# Patient Record
Sex: FEMALE | Race: WHITE | NOT HISPANIC OR LATINO | Employment: OTHER | URBAN - METROPOLITAN AREA
[De-identification: names, ages, dates, MRNs, and addresses within clinical notes are randomized per-mention and may not be internally consistent; named-entity substitution may affect disease eponyms.]

---

## 2017-02-07 ENCOUNTER — HOSPITAL ENCOUNTER (OUTPATIENT)
Dept: INFUSION CENTER | Facility: CLINIC | Age: 77
Discharge: HOME | End: 2017-02-07
Payer: MEDICARE

## 2017-02-07 LAB — CANCER AG125 SERPL-ACNC: 13.7 U/ML (ref 0–30)

## 2017-02-07 PROCEDURE — 86304 IMMUNOASSAY TUMOR CA 125: CPT | Performed by: OBSTETRICS & GYNECOLOGY

## 2017-02-07 RX ADMIN — Medication 300 UNITS: at 08:35

## 2017-02-07 NOTE — PROGRESS NOTES
Pt without complaint, tolerated port flush with central labs as ordered by Dr Amelia St, well  Pt left unit in stable condition without question or concern  Pt declines AVS, pt will call to reschedule next port flush appt

## 2017-02-14 ENCOUNTER — ALLSCRIPTS OFFICE VISIT (OUTPATIENT)
Dept: OTHER | Facility: OTHER | Age: 77
End: 2017-02-14

## 2017-02-14 DIAGNOSIS — C54.9 MALIGNANT NEOPLASM OF CORPUS UTERI (HCC): ICD-10-CM

## 2017-04-04 ENCOUNTER — APPOINTMENT (OUTPATIENT)
Dept: LAB | Facility: CLINIC | Age: 77
End: 2017-04-04
Payer: MEDICARE

## 2017-04-04 ENCOUNTER — HOSPITAL ENCOUNTER (OUTPATIENT)
Dept: INFUSION CENTER | Facility: CLINIC | Age: 77
Discharge: HOME | End: 2017-04-04
Payer: MEDICARE

## 2017-04-04 ENCOUNTER — TRANSCRIBE ORDERS (OUTPATIENT)
Dept: LAB | Facility: CLINIC | Age: 77
End: 2017-04-04

## 2017-04-04 DIAGNOSIS — Z51.81 ENCOUNTER FOR THERAPEUTIC DRUG MONITORING: Primary | ICD-10-CM

## 2017-04-04 DIAGNOSIS — E13.9 OTHER SPECIFIED DIABETES MELLITUS: ICD-10-CM

## 2017-04-04 DIAGNOSIS — E13.9 OTHER SPECIFIED DIABETES MELLITUS: Primary | ICD-10-CM

## 2017-04-04 DIAGNOSIS — Z51.81 ENCOUNTER FOR THERAPEUTIC DRUG MONITORING: ICD-10-CM

## 2017-04-04 LAB
ALBUMIN SERPL BCP-MCNC: 3.8 G/DL (ref 3.5–5)
ALP SERPL-CCNC: 103 U/L (ref 46–116)
ALT SERPL W P-5'-P-CCNC: <6 U/L (ref 12–78)
ANION GAP SERPL CALCULATED.3IONS-SCNC: 9 MMOL/L (ref 4–13)
ANISOCYTOSIS BLD QL SMEAR: PRESENT
AST SERPL W P-5'-P-CCNC: 13 U/L (ref 5–45)
BASOPHILS # BLD AUTO: 0.06 THOUSAND/UL (ref 0–0.1)
BASOPHILS NFR MAR MANUAL: 1 % (ref 0–1)
BILIRUB SERPL-MCNC: 0.5 MG/DL (ref 0.2–1)
BUN SERPL-MCNC: 14 MG/DL (ref 5–25)
CALCIUM SERPL-MCNC: 8.8 MG/DL (ref 8.3–10.1)
CHLORIDE SERPL-SCNC: 105 MMOL/L (ref 100–108)
CO2 SERPL-SCNC: 28 MMOL/L (ref 21–32)
CREAT SERPL-MCNC: 0.59 MG/DL (ref 0.6–1.3)
CREAT UR-MCNC: 150 MG/DL
EOSINOPHIL # BLD AUTO: 0.31 THOUSAND/UL (ref 0–0.61)
EOSINOPHIL NFR BLD MANUAL: 5 % (ref 0–6)
ERYTHROCYTE [DISTWIDTH] IN BLOOD BY AUTOMATED COUNT: 16.1 % (ref 11.6–15.1)
ERYTHROCYTE [SEDIMENTATION RATE] IN BLOOD: 45 MM/HOUR (ref 0–20)
EST. AVERAGE GLUCOSE BLD GHB EST-MCNC: 171 MG/DL
GFR SERPL CREATININE-BSD FRML MDRD: >60 ML/MIN/1.73SQ M
GLUCOSE P FAST SERPL-MCNC: 161 MG/DL (ref 65–99)
HBA1C MFR BLD: 7.6 % (ref 4.2–6.3)
HCT VFR BLD AUTO: 32.2 % (ref 34.8–46.1)
HGB BLD-MCNC: 10.6 G/DL (ref 11.5–15.4)
LG PLATELETS BLD QL SMEAR: PRESENT
LYMPHOCYTES # BLD AUTO: 1.4 THOUSAND/UL (ref 0.6–4.47)
LYMPHOCYTES # BLD AUTO: 23 % (ref 14–44)
MCH RBC QN AUTO: 31.3 PG (ref 26.8–34.3)
MCHC RBC AUTO-ENTMCNC: 33 G/DL (ref 31.4–37.4)
MCV RBC AUTO: 95 FL (ref 82–98)
MICROALBUMIN UR-MCNC: 33.4 MG/L (ref 0–20)
MICROALBUMIN/CREAT 24H UR: 22 MG/G CREATININE (ref 0–30)
MONOCYTES # BLD AUTO: 0.43 THOUSAND/UL (ref 0–1.22)
MONOCYTES NFR BLD AUTO: 7 % (ref 4–12)
NEUTS BAND NFR BLD MANUAL: 3 % (ref 0–8)
NEUTS SEG # BLD: 3.9 THOUSAND/UL (ref 1.81–6.82)
NEUTS SEG NFR BLD AUTO: 61 % (ref 43–75)
PLATELET # BLD AUTO: 186 THOUSANDS/UL (ref 149–390)
PLATELET BLD QL SMEAR: ADEQUATE
PMV BLD AUTO: 9.1 FL (ref 8.9–12.7)
POTASSIUM SERPL-SCNC: 4 MMOL/L (ref 3.5–5.3)
PROT SERPL-MCNC: 6.9 G/DL (ref 6.4–8.2)
RBC # BLD AUTO: 3.39 MILLION/UL (ref 3.81–5.12)
SODIUM SERPL-SCNC: 142 MMOL/L (ref 136–145)
TOTAL CELLS COUNTED SPEC: 100
WBC # BLD AUTO: 6.1 THOUSAND/UL (ref 4.31–10.16)
WBC NRBC COR # BLD: 6.1 THOUSAND/UL (ref 4.31–10.16)

## 2017-04-04 PROCEDURE — 85027 COMPLETE CBC AUTOMATED: CPT

## 2017-04-04 PROCEDURE — 80053 COMPREHEN METABOLIC PANEL: CPT

## 2017-04-04 PROCEDURE — 85007 BL SMEAR W/DIFF WBC COUNT: CPT

## 2017-04-04 PROCEDURE — 85652 RBC SED RATE AUTOMATED: CPT

## 2017-04-04 PROCEDURE — 82043 UR ALBUMIN QUANTITATIVE: CPT | Performed by: FAMILY MEDICINE

## 2017-04-04 PROCEDURE — 82570 ASSAY OF URINE CREATININE: CPT | Performed by: FAMILY MEDICINE

## 2017-04-04 PROCEDURE — 83036 HEMOGLOBIN GLYCOSYLATED A1C: CPT

## 2017-04-04 RX ADMIN — Medication 300 UNITS: at 09:31

## 2017-04-04 NOTE — PLAN OF CARE
Problem: Potential for Falls  Goal: Patient will remain free of falls  INTERVENTIONS:  - Assess patient frequently for physical needs  - Identify cognitive and physical deficits and behaviors that affect risk of falls    - [place] fall precautions as indicated by assessment   - Educate patient/family on patient safety including physical limitations  - Instruct patient to call for assistance with activity based on assessment  - Modify environment to reduce risk of injury  - Consider OT/PT consult to assist with strengthening/mobility   Outcome: Progressing

## 2017-04-04 NOTE — PROGRESS NOTES
Pt here today for central labs, denies any discomforts, goo blood return noted    Pt aware of next appointment, declines AVS

## 2017-05-24 ENCOUNTER — HOSPITAL ENCOUNTER (OUTPATIENT)
Dept: INFUSION CENTER | Facility: CLINIC | Age: 77
Discharge: HOME | End: 2017-05-24
Payer: MEDICARE

## 2017-05-24 ENCOUNTER — TRANSCRIBE ORDERS (OUTPATIENT)
Dept: LAB | Facility: CLINIC | Age: 77
End: 2017-05-24

## 2017-05-24 ENCOUNTER — APPOINTMENT (OUTPATIENT)
Dept: LAB | Facility: CLINIC | Age: 77
End: 2017-05-24
Payer: MEDICARE

## 2017-05-24 DIAGNOSIS — Z51.81 ENCOUNTER FOR THERAPEUTIC DRUG MONITORING: Primary | ICD-10-CM

## 2017-05-24 DIAGNOSIS — Z51.81 ENCOUNTER FOR THERAPEUTIC DRUG MONITORING: ICD-10-CM

## 2017-05-24 LAB
ALBUMIN SERPL BCP-MCNC: 3.9 G/DL (ref 3.5–5)
ALT SERPL W P-5'-P-CCNC: 6 U/L (ref 12–78)
AST SERPL W P-5'-P-CCNC: 39 U/L (ref 5–45)
CREAT SERPL-MCNC: 0.66 MG/DL (ref 0.6–1.3)
ERYTHROCYTE [DISTWIDTH] IN BLOOD BY AUTOMATED COUNT: 16 % (ref 11.6–15.1)
ERYTHROCYTE [SEDIMENTATION RATE] IN BLOOD: 40 MM/HOUR (ref 0–20)
GFR SERPL CREATININE-BSD FRML MDRD: >60 ML/MIN/1.73SQ M
GRANULOCYTES NFR BLD AUTO: 78.9 % (ref 47–80)
GRANULOCYTES NFR BLD: 5 THOUSAND/ΜL (ref 1.85–7.82)
HCT VFR BLD AUTO: 33.7 % (ref 34.8–46.1)
HGB BLD-MCNC: 11 G/DL (ref 11.5–15.4)
LYMPHOCYTES # BLD AUTO: 1.1 THOUSANDS/ΜL (ref 0.6–4.47)
LYMPHOCYTES NFR BLD AUTO: 17 % (ref 14–44)
MCH RBC QN AUTO: 31 PG (ref 26.8–34.3)
MCHC RBC AUTO-ENTMCNC: 32.6 G/DL (ref 31.4–37.4)
MCV RBC AUTO: 95 FL (ref 82–98)
MONOCYTES # BLD AUTO: 0.3 THOUSAND/ΜL (ref 0.17–1.22)
MONOCYTES NFR BLD AUTO: 4 % (ref 4–12)
PLATELET # BLD AUTO: 190 THOUSANDS/UL (ref 149–390)
PMV BLD AUTO: 8.3 FL (ref 8.9–12.7)
RBC # BLD AUTO: 3.55 MILLION/UL (ref 3.81–5.12)
WBC # BLD AUTO: 6.4 THOUSAND/UL (ref 4.31–10.16)
WBC NRBC COR # BLD: 6.4 THOUSAND/UL (ref 4.31–10.16)

## 2017-05-24 PROCEDURE — 82040 ASSAY OF SERUM ALBUMIN: CPT

## 2017-05-24 PROCEDURE — 84460 ALANINE AMINO (ALT) (SGPT): CPT

## 2017-05-24 PROCEDURE — 85025 COMPLETE CBC W/AUTO DIFF WBC: CPT

## 2017-05-24 PROCEDURE — 85652 RBC SED RATE AUTOMATED: CPT

## 2017-05-24 PROCEDURE — 82565 ASSAY OF CREATININE: CPT

## 2017-05-24 PROCEDURE — 84450 TRANSFERASE (AST) (SGOT): CPT

## 2017-05-24 RX ADMIN — Medication 300 UNITS: at 10:27

## 2017-06-22 ENCOUNTER — TRANSCRIBE ORDERS (OUTPATIENT)
Dept: ADMINISTRATIVE | Facility: HOSPITAL | Age: 77
End: 2017-06-22

## 2017-06-22 DIAGNOSIS — C55 MALIGNANT NEOPLASM OF UTERUS (HCC): ICD-10-CM

## 2017-06-22 DIAGNOSIS — C55 MALIGNANT NEOPLASM OF UTERUS, PART UNSPECIFIED (HCC): Primary | ICD-10-CM

## 2017-06-22 DIAGNOSIS — N93.9 VAGINAL HEMORRHAGE: ICD-10-CM

## 2017-06-22 DIAGNOSIS — N93.9 ABNORMAL UTERINE AND VAGINAL BLEEDING, UNSPECIFIED: ICD-10-CM

## 2017-06-22 DIAGNOSIS — K62.5 HEMORRHAGE OF ANUS AND RECTUM: ICD-10-CM

## 2017-06-22 DIAGNOSIS — R19.7 DIARRHEA: ICD-10-CM

## 2017-06-28 ENCOUNTER — HOSPITAL ENCOUNTER (OUTPATIENT)
Dept: INFUSION CENTER | Facility: CLINIC | Age: 77
Discharge: HOME | End: 2017-06-28
Payer: MEDICARE

## 2017-06-28 LAB
BUN SERPL-MCNC: 13 MG/DL (ref 5–25)
CANCER AG125 SERPL-ACNC: 14.7 U/ML (ref 0–30)
CREAT SERPL-MCNC: 0.71 MG/DL (ref 0.6–1.3)
GFR SERPL CREATININE-BSD FRML MDRD: >60 ML/MIN/1.73SQ M

## 2017-06-28 PROCEDURE — 84520 ASSAY OF UREA NITROGEN: CPT | Performed by: OBSTETRICS & GYNECOLOGY

## 2017-06-28 PROCEDURE — 82565 ASSAY OF CREATININE: CPT | Performed by: OBSTETRICS & GYNECOLOGY

## 2017-06-28 PROCEDURE — 86304 IMMUNOASSAY TUMOR CA 125: CPT | Performed by: OBSTETRICS & GYNECOLOGY

## 2017-06-28 RX ADMIN — Medication 300 UNITS: at 09:53

## 2017-06-30 ENCOUNTER — HOSPITAL ENCOUNTER (OUTPATIENT)
Dept: CT IMAGING | Facility: HOSPITAL | Age: 77
Discharge: HOME | End: 2017-06-30
Payer: MEDICARE

## 2017-06-30 DIAGNOSIS — N93.9 ABNORMAL UTERINE AND VAGINAL BLEEDING, UNSPECIFIED: ICD-10-CM

## 2017-06-30 DIAGNOSIS — C55 MALIGNANT NEOPLASM OF UTERUS (HCC): ICD-10-CM

## 2017-06-30 PROCEDURE — 74177 CT ABD & PELVIS W/CONTRAST: CPT

## 2017-06-30 RX ADMIN — IOHEXOL 100 ML: 350 INJECTION, SOLUTION INTRAVENOUS at 13:30

## 2017-07-11 ENCOUNTER — ALLSCRIPTS OFFICE VISIT (OUTPATIENT)
Dept: OTHER | Facility: OTHER | Age: 77
End: 2017-07-11

## 2017-08-22 ENCOUNTER — APPOINTMENT (OUTPATIENT)
Dept: LAB | Facility: CLINIC | Age: 77
End: 2017-08-22
Payer: MEDICARE

## 2017-08-22 ENCOUNTER — HOSPITAL ENCOUNTER (OUTPATIENT)
Dept: INFUSION CENTER | Facility: CLINIC | Age: 77
Discharge: HOME | End: 2017-08-22
Payer: MEDICARE

## 2017-08-22 ENCOUNTER — TRANSCRIBE ORDERS (OUTPATIENT)
Dept: LAB | Facility: CLINIC | Age: 77
End: 2017-08-22

## 2017-08-22 DIAGNOSIS — Z51.81 ENCOUNTER FOR THERAPEUTIC DRUG MONITORING: Primary | ICD-10-CM

## 2017-08-22 DIAGNOSIS — Z51.81 ENCOUNTER FOR THERAPEUTIC DRUG MONITORING: ICD-10-CM

## 2017-08-22 LAB
ALBUMIN SERPL BCP-MCNC: 2.5 G/DL (ref 3.5–5)
ALT SERPL W P-5'-P-CCNC: 22 U/L (ref 12–78)
AST SERPL W P-5'-P-CCNC: 20 U/L (ref 5–45)
BASOPHILS # BLD AUTO: 0 THOUSANDS/ΜL (ref 0–0.1)
BASOPHILS NFR BLD AUTO: 0 % (ref 0–1)
CREAT SERPL-MCNC: 0.57 MG/DL (ref 0.6–1.3)
EOSINOPHIL # BLD AUTO: 0.11 THOUSAND/ΜL (ref 0–0.61)
EOSINOPHIL NFR BLD AUTO: 4 % (ref 0–6)
ERYTHROCYTE [DISTWIDTH] IN BLOOD BY AUTOMATED COUNT: 15.5 % (ref 11.6–15.1)
ERYTHROCYTE [SEDIMENTATION RATE] IN BLOOD: 48 MM/HOUR (ref 0–20)
GFR SERPL CREATININE-BSD FRML MDRD: 90 ML/MIN/1.73SQ M
HCT VFR BLD AUTO: 20.7 % (ref 34.8–46.1)
HGB BLD-MCNC: 6.7 G/DL (ref 11.5–15.4)
LYMPHOCYTES # BLD AUTO: 0.61 THOUSANDS/ΜL (ref 0.6–4.47)
LYMPHOCYTES NFR BLD AUTO: 19 % (ref 14–44)
MCH RBC QN AUTO: 31.5 PG (ref 26.8–34.3)
MCHC RBC AUTO-ENTMCNC: 32.4 G/DL (ref 31.4–37.4)
MCV RBC AUTO: 97 FL (ref 82–98)
MONOCYTES # BLD AUTO: 0.34 THOUSAND/ΜL (ref 0.17–1.22)
MONOCYTES NFR BLD AUTO: 11 % (ref 4–12)
NEUTROPHILS # BLD AUTO: 2.11 THOUSANDS/ΜL (ref 1.85–7.62)
NEUTS SEG NFR BLD AUTO: 66 % (ref 43–75)
NRBC BLD AUTO-RTO: 0 /100 WBCS
PLATELET # BLD AUTO: 113 THOUSANDS/UL (ref 149–390)
PMV BLD AUTO: 11 FL (ref 8.9–12.7)
RBC # BLD AUTO: 2.13 MILLION/UL (ref 3.81–5.12)
WBC # BLD AUTO: 3.17 THOUSAND/UL (ref 4.31–10.16)

## 2017-08-22 PROCEDURE — 82565 ASSAY OF CREATININE: CPT

## 2017-08-22 PROCEDURE — 84460 ALANINE AMINO (ALT) (SGPT): CPT

## 2017-08-22 PROCEDURE — 82040 ASSAY OF SERUM ALBUMIN: CPT

## 2017-08-22 PROCEDURE — 36415 COLL VENOUS BLD VENIPUNCTURE: CPT

## 2017-08-22 PROCEDURE — 85652 RBC SED RATE AUTOMATED: CPT

## 2017-08-22 PROCEDURE — 85025 COMPLETE CBC W/AUTO DIFF WBC: CPT

## 2017-08-22 PROCEDURE — 84450 TRANSFERASE (AST) (SGOT): CPT

## 2017-08-22 RX ADMIN — Medication 300 UNITS: at 10:13

## 2017-08-22 NOTE — PROGRESS NOTES
Pt without complaint, labs drawn from port and port flushed per hospital protocol  Pt tolerated all well, left unit without question or concern  Pt will call to schedule next port flush appt

## 2017-10-03 ENCOUNTER — HOSPITAL ENCOUNTER (OUTPATIENT)
Dept: INFUSION CENTER | Facility: CLINIC | Age: 77
Discharge: HOME | End: 2017-10-03
Payer: MEDICARE

## 2017-10-03 ENCOUNTER — APPOINTMENT (OUTPATIENT)
Dept: LAB | Facility: CLINIC | Age: 77
End: 2017-10-03
Payer: MEDICARE

## 2017-10-03 ENCOUNTER — TRANSCRIBE ORDERS (OUTPATIENT)
Dept: LAB | Facility: CLINIC | Age: 77
End: 2017-10-03

## 2017-10-03 VITALS
SYSTOLIC BLOOD PRESSURE: 142 MMHG | DIASTOLIC BLOOD PRESSURE: 78 MMHG | RESPIRATION RATE: 18 BRPM | TEMPERATURE: 98.1 F | HEART RATE: 78 BPM

## 2017-10-03 DIAGNOSIS — E13.8 DIABETES MELLITUS OF OTHER TYPE WITH COMPLICATION, UNSPECIFIED LONG TERM INSULIN USE STATUS: ICD-10-CM

## 2017-10-03 DIAGNOSIS — E13.8 DIABETES MELLITUS OF OTHER TYPE WITH COMPLICATION, UNSPECIFIED LONG TERM INSULIN USE STATUS: Primary | ICD-10-CM

## 2017-10-03 LAB
CHOLEST SERPL-MCNC: 159 MG/DL (ref 50–200)
HDLC SERPL-MCNC: 71 MG/DL (ref 40–60)
LDLC SERPL CALC-MCNC: 67 MG/DL (ref 0–100)
TRIGL SERPL-MCNC: 106 MG/DL

## 2017-10-03 PROCEDURE — 83036 HEMOGLOBIN GLYCOSYLATED A1C: CPT

## 2017-10-03 PROCEDURE — 80061 LIPID PANEL: CPT

## 2017-10-03 RX ADMIN — Medication 300 UNITS: at 09:35

## 2017-10-03 NOTE — PROGRESS NOTES
Patient here for just lab work today  Port accessed, good blood return  Labs drawn and flush performed   Patient provided with AVS

## 2017-10-03 NOTE — PLAN OF CARE
Problem: Potential for Falls  Goal: Patient will remain free of falls  INTERVENTIONS:  - Assess patient frequently for physical needs  -  Identify cognitive and physical deficits and behaviors that affect risk of falls    -  [place] fall precautions as indicated by assessment   - Educate patient/family on patient safety including physical limitations  - Instruct patient to call for assistance with activity based on assessment  - Modify environment to reduce risk of injury  - Consider OT/PT consult to assist with strengthening/mobility   Outcome: Progressing

## 2017-10-04 LAB
EST. AVERAGE GLUCOSE BLD GHB EST-MCNC: 174 MG/DL
HBA1C MFR BLD: 7.7 % (ref 4.2–6.3)

## 2017-11-29 ENCOUNTER — HOSPITAL ENCOUNTER (OUTPATIENT)
Dept: INFUSION CENTER | Facility: CLINIC | Age: 77
Discharge: HOME | End: 2017-11-29
Payer: MEDICARE

## 2017-11-29 ENCOUNTER — APPOINTMENT (OUTPATIENT)
Dept: LAB | Facility: CLINIC | Age: 77
End: 2017-11-29
Payer: MEDICARE

## 2017-11-29 ENCOUNTER — TRANSCRIBE ORDERS (OUTPATIENT)
Dept: LAB | Facility: CLINIC | Age: 77
End: 2017-11-29

## 2017-11-29 DIAGNOSIS — M05.771 RHEUMATOID ARTHRITIS INVOLVING BOTH ANKLES WITH POSITIVE RHEUMATOID FACTOR (HCC): Primary | ICD-10-CM

## 2017-11-29 DIAGNOSIS — M05.772 RHEUMATOID ARTHRITIS INVOLVING BOTH ANKLES WITH POSITIVE RHEUMATOID FACTOR (HCC): Primary | ICD-10-CM

## 2017-11-29 DIAGNOSIS — M05.772 RHEUMATOID ARTHRITIS INVOLVING BOTH ANKLES WITH POSITIVE RHEUMATOID FACTOR (HCC): ICD-10-CM

## 2017-11-29 DIAGNOSIS — M05.771 RHEUMATOID ARTHRITIS INVOLVING BOTH ANKLES WITH POSITIVE RHEUMATOID FACTOR (HCC): ICD-10-CM

## 2017-11-29 LAB
ALBUMIN SERPL BCP-MCNC: 3.5 G/DL (ref 3.5–5)
ALT SERPL W P-5'-P-CCNC: 52 U/L (ref 12–78)
AST SERPL W P-5'-P-CCNC: 32 U/L (ref 5–45)
CREAT SERPL-MCNC: 0.63 MG/DL (ref 0.6–1.3)
ERYTHROCYTE [DISTWIDTH] IN BLOOD BY AUTOMATED COUNT: 17 % (ref 11.6–15.1)
ERYTHROCYTE [SEDIMENTATION RATE] IN BLOOD: 30 MM/HOUR (ref 0–20)
GFR SERPL CREATININE-BSD FRML MDRD: 87 ML/MIN/1.73SQ M
GRANULOCYTES NFR BLD AUTO: 73.4 % (ref 47–80)
GRANULOCYTES NFR BLD: 4.6 THOUSAND/ΜL (ref 1.85–7.82)
HCT VFR BLD AUTO: 32.4 % (ref 34.8–46.1)
HGB BLD-MCNC: 10.7 G/DL (ref 11.5–15.4)
LYMPHOCYTES # BLD AUTO: 1.3 THOUSANDS/ΜL (ref 0.6–4.47)
LYMPHOCYTES NFR BLD AUTO: 21 % (ref 14–44)
MCH RBC QN AUTO: 31.3 PG (ref 26.8–34.3)
MCHC RBC AUTO-ENTMCNC: 33 G/DL (ref 31.4–37.4)
MCV RBC AUTO: 95 FL (ref 82–98)
MONOCYTES # BLD AUTO: 0.4 THOUSAND/ΜL (ref 0.17–1.22)
MONOCYTES NFR BLD AUTO: 6 % (ref 4–12)
PLATELET # BLD AUTO: 148 THOUSANDS/UL (ref 149–390)
PMV BLD AUTO: 8.5 FL (ref 8.9–12.7)
RBC # BLD AUTO: 3.41 MILLION/UL (ref 3.81–5.12)
WBC # BLD AUTO: 6.2 THOUSAND/UL (ref 4.31–10.16)
WBC NRBC COR # BLD: 6.2 THOUSAND/UL (ref 4.31–10.16)

## 2017-11-29 PROCEDURE — 36415 COLL VENOUS BLD VENIPUNCTURE: CPT

## 2017-11-29 PROCEDURE — 82040 ASSAY OF SERUM ALBUMIN: CPT

## 2017-11-29 PROCEDURE — 85652 RBC SED RATE AUTOMATED: CPT

## 2017-11-29 PROCEDURE — 84450 TRANSFERASE (AST) (SGOT): CPT

## 2017-11-29 PROCEDURE — 84460 ALANINE AMINO (ALT) (SGPT): CPT

## 2017-11-29 PROCEDURE — 85025 COMPLETE CBC W/AUTO DIFF WBC: CPT

## 2017-11-29 PROCEDURE — 82565 ASSAY OF CREATININE: CPT

## 2017-11-29 RX ADMIN — Medication 300 UNITS: at 09:35

## 2017-11-29 NOTE — PROGRESS NOTES
Pt without complaint, central labs drawn as per Dr Rosemarie Sevilla order, port flushed per protocol, pt tolerated all well, AVS provided

## 2018-01-02 ENCOUNTER — HOSPITAL ENCOUNTER (OUTPATIENT)
Dept: INFUSION CENTER | Facility: CLINIC | Age: 78
Discharge: HOME | End: 2018-01-04
Payer: MEDICARE

## 2018-01-02 LAB — CANCER AG125 SERPL-ACNC: 10.6 U/ML (ref 0–30)

## 2018-01-02 PROCEDURE — 86304 IMMUNOASSAY TUMOR CA 125: CPT | Performed by: OBSTETRICS & GYNECOLOGY

## 2018-01-02 RX ADMIN — Medication 300 UNITS: at 09:23

## 2018-01-02 NOTE — PROGRESS NOTES
Central labs drawn via port  Catheter maintenance performed per protocol without complications  Pt will call to schedule her next appointment   Declined AVS

## 2018-01-14 VITALS
RESPIRATION RATE: 16 BRPM | DIASTOLIC BLOOD PRESSURE: 72 MMHG | SYSTOLIC BLOOD PRESSURE: 150 MMHG | HEART RATE: 78 BPM | HEIGHT: 62 IN | WEIGHT: 175.38 LBS | TEMPERATURE: 97.8 F | BODY MASS INDEX: 32.27 KG/M2

## 2018-01-14 VITALS
RESPIRATION RATE: 20 BRPM | WEIGHT: 177.25 LBS | TEMPERATURE: 97.7 F | DIASTOLIC BLOOD PRESSURE: 74 MMHG | BODY MASS INDEX: 32.62 KG/M2 | HEIGHT: 62 IN | HEART RATE: 78 BPM | SYSTOLIC BLOOD PRESSURE: 146 MMHG

## 2018-01-16 ENCOUNTER — GENERIC CONVERSION - ENCOUNTER (OUTPATIENT)
Dept: OTHER | Facility: OTHER | Age: 78
End: 2018-01-16

## 2018-01-16 DIAGNOSIS — C55 MALIGNANT NEOPLASM OF UTERUS (HCC): ICD-10-CM

## 2018-01-16 DIAGNOSIS — C54.9 MALIGNANT NEOPLASM OF CORPUS UTERI (HCC): ICD-10-CM

## 2018-01-24 VITALS
HEART RATE: 88 BPM | TEMPERATURE: 97.9 F | WEIGHT: 175.5 LBS | DIASTOLIC BLOOD PRESSURE: 72 MMHG | RESPIRATION RATE: 20 BRPM | SYSTOLIC BLOOD PRESSURE: 140 MMHG | BODY MASS INDEX: 32.3 KG/M2 | HEIGHT: 62 IN

## 2018-03-06 ENCOUNTER — APPOINTMENT (OUTPATIENT)
Dept: LAB | Facility: CLINIC | Age: 78
End: 2018-03-06
Payer: MEDICARE

## 2018-03-06 ENCOUNTER — TRANSCRIBE ORDERS (OUTPATIENT)
Dept: LAB | Facility: CLINIC | Age: 78
End: 2018-03-06

## 2018-03-06 ENCOUNTER — HOSPITAL ENCOUNTER (OUTPATIENT)
Dept: INFUSION CENTER | Facility: CLINIC | Age: 78
Discharge: HOME | End: 2018-03-06
Payer: MEDICARE

## 2018-03-06 DIAGNOSIS — Z51.81 ENCOUNTER FOR THERAPEUTIC DRUG MONITORING: ICD-10-CM

## 2018-03-06 DIAGNOSIS — E13.10 TYPE II OR UNSPECIFIED TYPE DIABETES MELLITUS WITH KETOACIDOSIS, UNCONTROLLED(250.12) (HCC): Primary | ICD-10-CM

## 2018-03-06 DIAGNOSIS — Z51.81 ENCOUNTER FOR THERAPEUTIC DRUG MONITORING: Primary | ICD-10-CM

## 2018-03-06 DIAGNOSIS — E13.10 TYPE II OR UNSPECIFIED TYPE DIABETES MELLITUS WITH KETOACIDOSIS, UNCONTROLLED(250.12) (HCC): ICD-10-CM

## 2018-03-06 LAB
ALBUMIN SERPL BCP-MCNC: 3.6 G/DL (ref 3.2–5)
ALBUMIN SERPL BCP-MCNC: 3.6 G/DL (ref 3.5–5)
ALP SERPL-CCNC: 102 U/L (ref 46–116)
ALT SERPL W P-5'-P-CCNC: 82 U/L (ref 12–78)
ALT SERPL W P-5'-P-CCNC: 99 U/L (ref 12–78)
ANION GAP SERPL CALCULATED.3IONS-SCNC: 8 MMOL/L (ref 4–13)
AST SERPL W P-5'-P-CCNC: 70 U/L (ref 5–45)
AST SERPL W P-5'-P-CCNC: 70 U/L (ref 5–45)
BASOPHILS # BLD AUTO: 0 THOUSAND/UL (ref 0–0.1)
BASOPHILS NFR MAR MANUAL: 0 % (ref 0–1)
BILIRUB SERPL-MCNC: 0.7 MG/DL (ref 0.2–1)
BUN SERPL-MCNC: 15 MG/DL (ref 5–25)
CALCIUM SERPL-MCNC: 9.3 MG/DL (ref 8.3–10.1)
CHLORIDE SERPL-SCNC: 101 MMOL/L (ref 98–108)
CHOLEST SERPL-MCNC: 161 MG/DL (ref 50–200)
CO2 SERPL-SCNC: 28 MMOL/L (ref 21–32)
CREAT SERPL-MCNC: 0.57 MG/DL (ref 0.6–1.3)
CREAT SERPL-MCNC: 0.8 MG/DL (ref 0.6–1.3)
CREAT UR-MCNC: 145 MG/DL
EOSINOPHIL # BLD AUTO: 0.15 THOUSAND/UL (ref 0–0.61)
EOSINOPHIL NFR BLD MANUAL: 2 % (ref 0–6)
ERYTHROCYTE [DISTWIDTH] IN BLOOD BY AUTOMATED COUNT: 16.8 % (ref 11.6–15.1)
ERYTHROCYTE [SEDIMENTATION RATE] IN BLOOD: 34 MM/HOUR (ref 0–20)
EST. AVERAGE GLUCOSE BLD GHB EST-MCNC: 220 MG/DL
GFR SERPL CREATININE-BSD FRML MDRD: 71 ML/MIN/1.73SQ M
GFR SERPL CREATININE-BSD FRML MDRD: 90 ML/MIN/1.73SQ M
GLUCOSE SERPL-MCNC: 215 MG/DL (ref 65–140)
HBA1C MFR BLD: 9.3 % (ref 4.2–6.3)
HCT VFR BLD AUTO: 35.1 % (ref 34.8–46.1)
HDLC SERPL-MCNC: 78 MG/DL (ref 40–60)
HGB BLD-MCNC: 11.5 G/DL (ref 11.5–15.4)
LDLC SERPL CALC-MCNC: 66 MG/DL (ref 0–100)
LYMPHOCYTES # BLD AUTO: 1.16 THOUSAND/UL (ref 0.6–4.47)
LYMPHOCYTES # BLD AUTO: 15 % (ref 14–44)
MCH RBC QN AUTO: 32.2 PG (ref 26.8–34.3)
MCHC RBC AUTO-ENTMCNC: 32.8 G/DL (ref 31.4–37.4)
MCV RBC AUTO: 98 FL (ref 82–98)
MICROALBUMIN UR-MCNC: 45.1 MG/L (ref 0–20)
MICROALBUMIN/CREAT 24H UR: 31 MG/G CREATININE (ref 0–30)
MONOCYTES # BLD AUTO: 0.46 THOUSAND/UL (ref 0–1.22)
MONOCYTES NFR BLD AUTO: 6 % (ref 4–12)
NEUTS BAND NFR BLD MANUAL: 1 % (ref 0–8)
NEUTS SEG # BLD: 5.93 THOUSAND/UL (ref 1.81–6.82)
NEUTS SEG NFR BLD AUTO: 76 % (ref 43–75)
PLATELET # BLD AUTO: 155 THOUSANDS/UL (ref 149–390)
PLATELET BLD QL SMEAR: ADEQUATE
PMV BLD AUTO: 9.4 FL (ref 8.9–12.7)
POTASSIUM SERPL-SCNC: 4 MMOL/L (ref 3.5–5.3)
PROT SERPL-MCNC: 7 G/DL (ref 6.4–8.2)
RBC # BLD AUTO: 3.58 MILLION/UL (ref 3.81–5.12)
RBC MORPH BLD: NORMAL
SODIUM SERPL-SCNC: 137 MMOL/L (ref 136–145)
TOTAL CELLS COUNTED SPEC: 100
TRIGL SERPL-MCNC: 84 MG/DL
WBC # BLD AUTO: 7.7 THOUSAND/UL (ref 4.31–10.16)
WBC NRBC COR # BLD: 7.7 THOUSAND/UL (ref 4.31–10.16)

## 2018-03-06 PROCEDURE — 82565 ASSAY OF CREATININE: CPT

## 2018-03-06 PROCEDURE — 80053 COMPREHEN METABOLIC PANEL: CPT

## 2018-03-06 PROCEDURE — 82043 UR ALBUMIN QUANTITATIVE: CPT | Performed by: FAMILY MEDICINE

## 2018-03-06 PROCEDURE — 85652 RBC SED RATE AUTOMATED: CPT

## 2018-03-06 PROCEDURE — 83036 HEMOGLOBIN GLYCOSYLATED A1C: CPT

## 2018-03-06 PROCEDURE — 85027 COMPLETE CBC AUTOMATED: CPT

## 2018-03-06 PROCEDURE — 84450 TRANSFERASE (AST) (SGOT): CPT

## 2018-03-06 PROCEDURE — 82040 ASSAY OF SERUM ALBUMIN: CPT

## 2018-03-06 PROCEDURE — 84460 ALANINE AMINO (ALT) (SGPT): CPT

## 2018-03-06 PROCEDURE — 85007 BL SMEAR W/DIFF WBC COUNT: CPT

## 2018-03-06 PROCEDURE — 82570 ASSAY OF URINE CREATININE: CPT | Performed by: FAMILY MEDICINE

## 2018-03-06 PROCEDURE — 36415 COLL VENOUS BLD VENIPUNCTURE: CPT

## 2018-03-06 PROCEDURE — 80061 LIPID PANEL: CPT

## 2018-03-06 RX ADMIN — Medication 300 UNITS: at 09:40

## 2018-03-06 NOTE — PLAN OF CARE
Problem: Potential for Falls  Goal: Patient will remain free of falls  INTERVENTIONS:  - Assess patient frequently for physical needs  -  Identify cognitive and physical deficits and behaviors that affect risk of falls    -  [place] fall precautions as indicated by assessment   - Educate patient/family on patient safety including physical limitations  - Instruct patient to call for assistance with activity based on assessment  - Modify environment to reduce risk of injury  - Consider OT/PT consult to assist with strengthening/mobility   Outcome: Progressing

## 2018-03-06 NOTE — PROGRESS NOTES
Patient here for central labs  Port accessed without difficulty, good blood return noted, labs drawn and sent  Port flushed with Heparin per protocol  Patient declines AVS today, left unit in stable condition

## 2018-06-05 ENCOUNTER — APPOINTMENT (OUTPATIENT)
Dept: LAB | Facility: CLINIC | Age: 78
End: 2018-06-05
Payer: MEDICARE

## 2018-06-05 ENCOUNTER — TRANSCRIBE ORDERS (OUTPATIENT)
Dept: LAB | Facility: CLINIC | Age: 78
End: 2018-06-05

## 2018-06-05 ENCOUNTER — HOSPITAL ENCOUNTER (OUTPATIENT)
Dept: INFUSION CENTER | Facility: CLINIC | Age: 78
Discharge: HOME | End: 2018-06-05
Payer: MEDICARE

## 2018-06-05 DIAGNOSIS — M05.742 RHEUMATOID ARTHRITIS INVOLVING BOTH HANDS WITH POSITIVE RHEUMATOID FACTOR (HCC): ICD-10-CM

## 2018-06-05 DIAGNOSIS — Z79.4 ENCOUNTER FOR LONG-TERM (CURRENT) USE OF INSULIN (HCC): ICD-10-CM

## 2018-06-05 DIAGNOSIS — M05.742 RHEUMATOID ARTHRITIS INVOLVING BOTH HANDS WITH POSITIVE RHEUMATOID FACTOR (HCC): Primary | ICD-10-CM

## 2018-06-05 DIAGNOSIS — M05.741 RHEUMATOID ARTHRITIS INVOLVING BOTH HANDS WITH POSITIVE RHEUMATOID FACTOR (HCC): Primary | ICD-10-CM

## 2018-06-05 DIAGNOSIS — M05.741 RHEUMATOID ARTHRITIS INVOLVING BOTH HANDS WITH POSITIVE RHEUMATOID FACTOR (HCC): ICD-10-CM

## 2018-06-05 DIAGNOSIS — Z79.4 ENCOUNTER FOR LONG-TERM (CURRENT) USE OF INSULIN (HCC): Primary | ICD-10-CM

## 2018-06-05 LAB
ALBUMIN SERPL BCP-MCNC: 3.3 G/DL (ref 3.5–5.7)
ALP SERPL-CCNC: 102 U/L (ref 46–116)
ALT SERPL W P-5'-P-CCNC: 24 U/L (ref 12–78)
ANION GAP SERPL CALCULATED.3IONS-SCNC: 10 MMOL/L (ref 4–13)
AST SERPL W P-5'-P-CCNC: 44 U/L (ref 5–45)
BILIRUB SERPL-MCNC: 0.7 MG/DL (ref 0.2–1)
BUN SERPL-MCNC: 11 MG/DL (ref 5–25)
CALCIUM SERPL-MCNC: 9.2 MG/DL (ref 8.3–10.1)
CHLORIDE SERPL-SCNC: 106 MMOL/L (ref 98–108)
CO2 SERPL-SCNC: 27 MMOL/L (ref 21–32)
CREAT SERPL-MCNC: 1 MG/DL (ref 0.6–1.3)
ERYTHROCYTE [DISTWIDTH] IN BLOOD BY AUTOMATED COUNT: 15.6 % (ref 11.6–15.1)
EST. AVERAGE GLUCOSE BLD GHB EST-MCNC: 212 MG/DL
GFR SERPL CREATININE-BSD FRML MDRD: 54 ML/MIN/1.73SQ M
GLUCOSE SERPL-MCNC: 188 MG/DL (ref 65–140)
GRANULOCYTES NFR BLD AUTO: 78.7 % (ref 47–80)
GRANULOCYTES NFR BLD: 4.6 THOUSAND/ΜL (ref 1.85–7.82)
HBA1C MFR BLD: 9 % (ref 4.2–6.3)
HCT VFR BLD AUTO: 34.6 % (ref 34.8–46.1)
HGB BLD-MCNC: 11.2 G/DL (ref 11.5–15.4)
LYMPHOCYTES # BLD AUTO: 1 THOUSANDS/ΜL (ref 0.6–4.47)
LYMPHOCYTES NFR BLD AUTO: 17 % (ref 14–44)
MCH RBC QN AUTO: 31.4 PG (ref 26.8–34.3)
MCHC RBC AUTO-ENTMCNC: 32.4 G/DL (ref 31.4–37.4)
MCV RBC AUTO: 97 FL (ref 82–98)
MONOCYTES # BLD AUTO: 0.2 THOUSAND/ΜL (ref 0.17–1.22)
MONOCYTES NFR BLD AUTO: 4 % (ref 4–12)
PLATELET # BLD AUTO: 156 THOUSANDS/UL (ref 149–390)
PMV BLD AUTO: 8.5 FL (ref 8.9–12.7)
POTASSIUM SERPL-SCNC: 3.7 MMOL/L (ref 3.5–5.3)
PROT SERPL-MCNC: 6.9 G/DL (ref 6.4–8.2)
RBC # BLD AUTO: 3.57 MILLION/UL (ref 3.81–5.12)
SODIUM SERPL-SCNC: 143 MMOL/L (ref 136–145)
WBC # BLD AUTO: 5.8 THOUSAND/UL (ref 4.31–10.16)
WBC NRBC COR # BLD: 5.8 THOUSAND/UL (ref 4.31–10.16)

## 2018-06-05 PROCEDURE — 83036 HEMOGLOBIN GLYCOSYLATED A1C: CPT

## 2018-06-05 PROCEDURE — 36415 COLL VENOUS BLD VENIPUNCTURE: CPT

## 2018-06-05 PROCEDURE — 80053 COMPREHEN METABOLIC PANEL: CPT

## 2018-06-05 PROCEDURE — 85025 COMPLETE CBC W/AUTO DIFF WBC: CPT

## 2018-06-05 RX ADMIN — Medication 300 UNITS: at 08:56

## 2018-06-05 NOTE — PROGRESS NOTES
Blood work collected via port a cath  Port flushed per protocol  Pt will call to schedule next appointment   Refused AVS

## 2018-07-10 ENCOUNTER — HOSPITAL ENCOUNTER (OUTPATIENT)
Dept: INFUSION CENTER | Facility: CLINIC | Age: 78
Discharge: HOME | End: 2018-07-10
Payer: MEDICARE

## 2018-07-10 LAB — CANCER AG125 SERPL-ACNC: 14.9 U/ML (ref 0–30)

## 2018-07-10 PROCEDURE — 86304 IMMUNOASSAY TUMOR CA 125: CPT | Performed by: OBSTETRICS & GYNECOLOGY

## 2018-07-10 RX ADMIN — Medication 300 UNITS: at 09:44

## 2018-07-13 PROBLEM — C55 CARCINOSARCOMA OF UTERUS (HCC): Status: ACTIVE | Noted: 2018-07-13

## 2018-07-17 ENCOUNTER — OFFICE VISIT (OUTPATIENT)
Dept: GYNECOLOGIC ONCOLOGY | Facility: CLINIC | Age: 78
End: 2018-07-17
Payer: MEDICARE

## 2018-07-17 VITALS
RESPIRATION RATE: 16 BRPM | SYSTOLIC BLOOD PRESSURE: 126 MMHG | WEIGHT: 172.6 LBS | TEMPERATURE: 97.5 F | HEIGHT: 62 IN | DIASTOLIC BLOOD PRESSURE: 74 MMHG | BODY MASS INDEX: 31.76 KG/M2 | HEART RATE: 90 BPM

## 2018-07-17 DIAGNOSIS — C55 CARCINOSARCOMA OF UTERUS (HCC): Primary | ICD-10-CM

## 2018-07-17 PROCEDURE — 99212 OFFICE O/P EST SF 10 MIN: CPT | Performed by: OBSTETRICS & GYNECOLOGY

## 2018-07-17 RX ORDER — LIDOCAINE AND PRILOCAINE 25; 25 MG/G; MG/G
CREAM TOPICAL
COMMUNITY
Start: 2014-06-23 | End: 2019-08-12 | Stop reason: SDUPTHER

## 2018-07-17 RX ORDER — FLUTICASONE PROPIONATE 100 MCG
BLISTER, WITH INHALATION DEVICE INHALATION
Refills: 0 | COMMUNITY
Start: 2018-05-14

## 2018-07-17 RX ORDER — NAPROXEN SODIUM 220 MG
220 TABLET ORAL EVERY 12 HOURS PRN
COMMUNITY

## 2018-07-17 NOTE — LETTER
July 17, 2018     Chelita Gonzalez MD  [place] [address]  [facility] [address]    Patient: Ale Campos   YOB: 1940   Date of Visit: 7/17/2018       Dear Dr Phil Cintron:    Thank you for referring Greta Flores to me for evaluation  Below are my notes for this consultation  If you have questions, please do not hesitate to call me  I look forward to following your patient along with you  Sincerely,        Brittany Kelly MD        CC: No Recipients  Brittany Kelly MD  7/17/2018  9:27 AM  Sign at close encounter  Assessment/Plan:    Problem List Items Addressed This Visit        Genitourinary    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     -   Patient is asymptomatic  She has no evidence of recurrent disease  Her  is low at 14 units/milliliter  She is aware of warning signs  I plan to see her back in 6 months with pre visit   Relevant Orders                CHIEF COMPLAINT:      asymptomatic  Here for routine surveillance  Previous therapy:     Carcinosarcoma of uterus ([address] )    1/13/2014 Surgery     Robotic-assisted extensive lysis of adhesions, total hysterectomy, bilateral salpingo-oophorectomy, omentectomy, pelvic and periaortic lymphadenectomy  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 and 1 out of 15 positive pelvic lymph nodes  Positive washings  - 7/11/2014 Chemotherapy     Adjuvant chemotherapy + RT: s/p RT after 3 cycles of carbo/taxol (sandwiched plan) plus x3 cycles of post-RT chemo (sandwiched)  Completed July 11th 2014  Flexible sigmoidoscopy November 2017 (Dr Sullivan Asa): no evidence of polyps or malignancy  Radiation changes noted  Patient ID: Ale Campos is a 69 y o  female  HPI    Patient presents for routine surveillance visit  Denies vaginal bleeding, drainage or discharge    No changes in bowel or bladder function  Denies pelvic pain  Shortness of  breath with at baseline  The following portions of the patient's history were reviewed and updated as appropriate: allergies, current medications, past family history, past medical history, past social history, past surgical history and problem list     Review of Systems   as above  Otherwise 12 point review of systems unremarkable  Current Outpatient Prescriptions   Medication Sig Dispense Refill    albuterol (PROVENTIL HFA,VENTOLIN HFA) 90 mcg/act inhaler Inhale 2 puffs every 6 (six) hours as needed for wheezing   atorvastatin (LIPITOR) 10 mg tablet Take 10 mg by mouth daily   diphenhydrAMINE-APAP 12 5-325 MG/15ML LIQD Take 25 mg by mouth daily      FLOVENT DISKUS 100 MCG/BLIST AEPB INHALE 1 PUFF 2 TIMES A DAY UNTIL COUGH RESOLVED  0    folic acid (FOLVITE) 1 mg tablet Take 1 mg by mouth daily   insulin glargine (LANTUS) 100 units/mL subcutaneous injection Inject 16 Units under the skin daily        lidocaine-prilocaine (EMLA) cream Apply topically      LORazepam (ATIVAN) 1 mg tablet Take 1 mg by mouth every 6 (six) hours as needed for anxiety   LOSARTAN POTASSIUM PO Take 50 mg by mouth daily   metFORMIN (GLUCOPHAGE) 500 mg tablet Take 500 mg by mouth 2 (two) times a day with meals        methotrexate 2 5 mg tablet Take 2 5 mg by mouth Pt takes 6 tabs on Wednesdays       naproxen sodium (ALEVE) 220 MG tablet Take 220 mg by mouth      omeprazole (PriLOSEC) 20 mg delayed release capsule Take 20 mg by mouth daily as needed  No current facility-administered medications for this visit  Objective:    Blood pressure 126/74, pulse 90, temperature 97 5 °F (36 4 °C), temperature source Oral, resp  rate 16, height 5' 2" (1 575 m), weight 78 3 kg (172 lb 9 6 oz)  Body mass index is 31 57 kg/m²  Body surface area is 1 8 meters squared  Physical Exam   Constitutional: She is oriented to person, place, and time   She appears well-developed and well-nourished  HENT:   Head: Normocephalic and atraumatic  Neck: Normal range of motion  Neck supple  No thyromegaly present  Cardiovascular: Normal rate, regular rhythm and normal heart sounds  Pulmonary/Chest: Effort normal  No respiratory distress (  Diffuse rales  )  She has rales  Abdominal: Soft  She exhibits no distension and no mass  There is no rebound  Genitourinary:   Genitourinary Comments:   Normal genitalia  Significant atrophy noted  Vagina almost completely agglutinated  Visible introitus with no gross lesions  Rectal exam reveals no pelvic masses or nodularity  Uterus, cervix and bilateral tubes and ovaries are surgically absent  Musculoskeletal: Normal range of motion  She exhibits no edema  Lymphadenopathy:     She has no cervical adenopathy  Neurological: She is alert and oriented to person, place, and time  Skin: Skin is warm and dry  No rash noted  Psychiatric: She has a normal mood and affect  Her behavior is normal    Vitals reviewed           Lab Results   Component Value Date     14 9 07/10/2018     Chris Bryan MD, [facility]  7/17/2018  9:26 AM

## 2018-07-17 NOTE — PROGRESS NOTES
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[facility] [address] )    1/13/2014 Surgery     Robotic-assisted extensive lysis of adhesions, total hysterectomy, bilateral salpingo-oophorectomy, omentectomy, pelvic and periaortic lymphadenectomy  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 and 1 out of 15 positive pelvic lymph nodes  Positive washings  - 7/11/2014 Chemotherapy     Adjuvant chemotherapy + RT: s/p RT after 3 cycles of carbo/taxol (sandwiched plan) plus x3 cycles of post-RT chemo (sandwiched)  Completed July 11th 2014  Flexible sigmoidoscopy November 2017 (Dr Noemí Arcos): no evidence of polyps or malignancy  Radiation changes noted  Patient ID: Vishnu Console is a 69 y o  female  HPI    Patient presents for routine surveillance visit  Denies vaginal bleeding, drainage or discharge  No changes in bowel or bladder function  Denies pelvic pain  Shortness of  breath with at baseline  The following portions of the patient's history were reviewed and updated as appropriate: allergies, current medications, past family history, past medical history, past social history, past surgical history and problem list     Review of Systems   as above  Otherwise 12 point review of systems unremarkable    Current Outpatient Prescriptions   Medication Sig Dispense Refill    albuterol (PROVENTIL HFA,VENTOLIN HFA) 90 mcg/act inhaler Inhale 2 puffs every 6 (six) hours as needed for wheezing   atorvastatin (LIPITOR) 10 mg tablet Take 10 mg by mouth daily   diphenhydrAMINE-APAP 12 5-325 MG/15ML LIQD Take 25 mg by mouth daily      FLOVENT DISKUS 100 MCG/BLIST AEPB INHALE 1 PUFF 2 TIMES A DAY UNTIL COUGH RESOLVED  0    folic acid (FOLVITE) 1 mg tablet Take 1 mg by mouth daily   insulin glargine (LANTUS) 100 units/mL subcutaneous injection Inject 16 Units under the skin daily        lidocaine-prilocaine (EMLA) cream Apply topically      LORazepam (ATIVAN) 1 mg tablet Take 1 mg by mouth every 6 (six) hours as needed for anxiety   LOSARTAN POTASSIUM PO Take 50 mg by mouth daily   metFORMIN (GLUCOPHAGE) 500 mg tablet Take 500 mg by mouth 2 (two) times a day with meals        methotrexate 2 5 mg tablet Take 2 5 mg by mouth Pt takes 6 tabs on Wednesdays       naproxen sodium (ALEVE) 220 MG tablet Take 220 mg by mouth      omeprazole (PriLOSEC) 20 mg delayed release capsule Take 20 mg by mouth daily as needed  No current facility-administered medications for this visit  Objective:    Blood pressure 126/74, pulse 90, temperature 97 5 °F (36 4 °C), temperature source Oral, resp  rate 16, height 5' 2" (1 575 m), weight 78 3 kg (172 lb 9 6 oz)  Body mass index is 31 57 kg/m²  Body surface area is 1 8 meters squared  Physical Exam   Constitutional: She is oriented to person, place, and time  She appears well-developed and well-nourished  HENT:   Head: Normocephalic and atraumatic  Neck: Normal range of motion  Neck supple  No thyromegaly present  Cardiovascular: Normal rate, regular rhythm and normal heart sounds  Pulmonary/Chest: Effort normal  No respiratory distress (  Diffuse rales  )  She has rales  Abdominal: Soft  She exhibits no distension and no mass  There is no rebound  Genitourinary:   Genitourinary Comments:   Normal genitalia  Significant atrophy noted  Vagina almost completely agglutinated    Visible introitus with no gross lesions  Rectal exam reveals no pelvic masses or nodularity  Uterus, cervix and bilateral tubes and ovaries are surgically absent  Musculoskeletal: Normal range of motion  She exhibits no edema  Lymphadenopathy:     She has no cervical adenopathy  Neurological: She is alert and oriented to person, place, and time  Skin: Skin is warm and dry  No rash noted  Psychiatric: She has a normal mood and affect  Her behavior is normal    Vitals reviewed           Lab Results   Component Value Date     14 9 07/10/2018     Tristian Corrigan MD, [facility]  7/17/2018  9:26 AM

## 2018-09-06 ENCOUNTER — HOSPITAL ENCOUNTER (OUTPATIENT)
Dept: INFUSION CENTER | Facility: CLINIC | Age: 78
Discharge: HOME | End: 2018-09-06
Payer: MEDICARE

## 2018-09-06 ENCOUNTER — TRANSCRIBE ORDERS (OUTPATIENT)
Dept: LAB | Facility: CLINIC | Age: 78
End: 2018-09-06

## 2018-09-06 ENCOUNTER — APPOINTMENT (OUTPATIENT)
Dept: LAB | Facility: CLINIC | Age: 78
End: 2018-09-06
Payer: MEDICARE

## 2018-09-06 DIAGNOSIS — M05.742 RHEUMATOID ARTHRITIS INVOLVING BOTH HANDS WITH POSITIVE RHEUMATOID FACTOR (HCC): Primary | ICD-10-CM

## 2018-09-06 DIAGNOSIS — M05.741 RHEUMATOID ARTHRITIS INVOLVING BOTH HANDS WITH POSITIVE RHEUMATOID FACTOR (HCC): Primary | ICD-10-CM

## 2018-09-06 DIAGNOSIS — M05.742 RHEUMATOID ARTHRITIS INVOLVING BOTH HANDS WITH POSITIVE RHEUMATOID FACTOR (HCC): ICD-10-CM

## 2018-09-06 DIAGNOSIS — M05.741 RHEUMATOID ARTHRITIS INVOLVING BOTH HANDS WITH POSITIVE RHEUMATOID FACTOR (HCC): ICD-10-CM

## 2018-09-06 LAB
ALBUMIN SERPL BCP-MCNC: 3.4 G/DL (ref 3.5–5)
ALT SERPL W P-5'-P-CCNC: 62 U/L (ref 12–78)
AST SERPL W P-5'-P-CCNC: 44 U/L (ref 5–45)
CREAT SERPL-MCNC: 0.67 MG/DL (ref 0.6–1.3)
ERYTHROCYTE [SEDIMENTATION RATE] IN BLOOD: 30 MM/HOUR (ref 0–20)
GFR SERPL CREATININE-BSD FRML MDRD: 85 ML/MIN/1.73SQ M

## 2018-09-06 PROCEDURE — 82040 ASSAY OF SERUM ALBUMIN: CPT

## 2018-09-06 PROCEDURE — 85652 RBC SED RATE AUTOMATED: CPT

## 2018-09-06 PROCEDURE — 84450 TRANSFERASE (AST) (SGOT): CPT

## 2018-09-06 PROCEDURE — 82565 ASSAY OF CREATININE: CPT

## 2018-09-06 PROCEDURE — 84460 ALANINE AMINO (ALT) (SGPT): CPT

## 2018-09-06 PROCEDURE — 36415 COLL VENOUS BLD VENIPUNCTURE: CPT

## 2018-09-06 RX ADMIN — Medication 300 UNITS: at 10:06

## 2018-09-06 NOTE — PLAN OF CARE
Problem: Potential for Falls  Goal: Patient will remain free of falls  INTERVENTIONS:  - Assess patient frequently for physical needs  -  Identify cognitive and physical deficits and behaviors that affect risk of falls    -  [place] fall precautions as indicated by assessment   - Educate patient/family on patient safety including physical limitations  - Instruct patient to call for assistance with activity based on assessment  - Modify environment to reduce risk of injury  - Consider OT/PT consult to assist with strengthening/mobility   Outcome: Progressing

## 2018-10-24 ENCOUNTER — TRANSCRIBE ORDERS (OUTPATIENT)
Dept: LAB | Facility: CLINIC | Age: 78
End: 2018-10-24

## 2018-10-24 ENCOUNTER — HOSPITAL ENCOUNTER (OUTPATIENT)
Dept: INFUSION CENTER | Facility: CLINIC | Age: 78
Discharge: HOME | End: 2018-10-24
Payer: MEDICARE

## 2018-10-24 ENCOUNTER — APPOINTMENT (OUTPATIENT)
Dept: LAB | Facility: CLINIC | Age: 78
End: 2018-10-24
Payer: MEDICARE

## 2018-10-24 DIAGNOSIS — Z51.81 ENCOUNTER FOR THERAPEUTIC DRUG MONITORING: Primary | ICD-10-CM

## 2018-10-24 DIAGNOSIS — Z51.81 ENCOUNTER FOR THERAPEUTIC DRUG MONITORING: ICD-10-CM

## 2018-10-24 LAB
ALBUMIN SERPL BCP-MCNC: 3.3 G/DL (ref 3.5–5)
ALT SERPL W P-5'-P-CCNC: 80 U/L (ref 12–78)
ANISOCYTOSIS BLD QL SMEAR: PRESENT
AST SERPL W P-5'-P-CCNC: 50 U/L (ref 5–45)
BASOPHILS # BLD AUTO: 0 THOUSAND/UL (ref 0–0.1)
BASOPHILS NFR MAR MANUAL: 0 % (ref 0–1)
CREAT SERPL-MCNC: 0.65 MG/DL (ref 0.6–1.3)
EOSINOPHIL # BLD AUTO: 0.11 THOUSAND/UL (ref 0–0.61)
EOSINOPHIL NFR BLD MANUAL: 2 % (ref 0–6)
ERYTHROCYTE [DISTWIDTH] IN BLOOD BY AUTOMATED COUNT: 16.7 % (ref 11.6–15.1)
ERYTHROCYTE [SEDIMENTATION RATE] IN BLOOD: 37 MM/HOUR (ref 0–20)
GFR SERPL CREATININE-BSD FRML MDRD: 86 ML/MIN/1.73SQ M
HCT VFR BLD AUTO: 31 % (ref 34.8–46.1)
HGB BLD-MCNC: 10.7 G/DL (ref 11.5–15.4)
LYMPHOCYTES # BLD AUTO: 1.48 THOUSAND/UL (ref 0.6–4.47)
LYMPHOCYTES # BLD AUTO: 28 % (ref 14–44)
MACROCYTES BLD QL AUTO: PRESENT
MCH RBC QN AUTO: 34.6 PG (ref 26.8–34.3)
MCHC RBC AUTO-ENTMCNC: 34.4 G/DL (ref 31.4–37.4)
MCV RBC AUTO: 100 FL (ref 82–98)
MONOCYTES # BLD AUTO: 0.21 THOUSAND/UL (ref 0–1.22)
MONOCYTES NFR BLD AUTO: 4 % (ref 4–12)
NEUTS BAND NFR BLD MANUAL: 1 % (ref 0–8)
NEUTS SEG # BLD: 3.5 THOUSAND/UL (ref 1.81–6.82)
NEUTS SEG NFR BLD AUTO: 65 % (ref 43–75)
OVALOCYTES BLD QL SMEAR: PRESENT
PLATELET # BLD AUTO: 145 THOUSANDS/UL (ref 149–390)
PLATELET BLD QL SMEAR: ABNORMAL
PMV BLD AUTO: 8.7 FL (ref 8.9–12.7)
RBC # BLD AUTO: 3.09 MILLION/UL (ref 3.81–5.12)
TOTAL CELLS COUNTED SPEC: 100
WBC # BLD AUTO: 5.3 THOUSAND/UL (ref 4.31–10.16)
WBC NRBC COR # BLD: 5.3 THOUSAND/UL (ref 4.31–10.16)

## 2018-10-24 PROCEDURE — 82040 ASSAY OF SERUM ALBUMIN: CPT

## 2018-10-24 PROCEDURE — 85007 BL SMEAR W/DIFF WBC COUNT: CPT

## 2018-10-24 PROCEDURE — 84450 TRANSFERASE (AST) (SGOT): CPT

## 2018-10-24 PROCEDURE — 85027 COMPLETE CBC AUTOMATED: CPT

## 2018-10-24 PROCEDURE — 84460 ALANINE AMINO (ALT) (SGPT): CPT

## 2018-10-24 PROCEDURE — 36415 COLL VENOUS BLD VENIPUNCTURE: CPT

## 2018-10-24 PROCEDURE — 82565 ASSAY OF CREATININE: CPT

## 2018-10-24 PROCEDURE — 85652 RBC SED RATE AUTOMATED: CPT

## 2018-10-24 RX ADMIN — SODIUM CHLORIDE, PRESERVATIVE FREE 300 UNITS: 5 INJECTION INTRAVENOUS at 08:30

## 2018-10-24 NOTE — PLAN OF CARE
Problem: Potential for Falls  Goal: Patient will remain free of falls  INTERVENTIONS:  - Assess patient frequently for physical needs  -  Identify cognitive and physical deficits and behaviors that affect risk of falls    -  [place] fall precautions as indicated by assessment   - Educate patient/family on patient safety including physical limitations  - Instruct patient to call for assistance with activity based on assessment  - Modify environment to reduce risk of injury  - Consider OT/PT consult to assist with strengthening/mobility   Outcome: Progressing

## 2018-11-28 ENCOUNTER — HOSPITAL ENCOUNTER (OUTPATIENT)
Dept: INFUSION CENTER | Facility: CLINIC | Age: 78
Discharge: HOME | End: 2018-11-28
Payer: MEDICARE

## 2018-11-28 ENCOUNTER — TRANSCRIBE ORDERS (OUTPATIENT)
Dept: LAB | Facility: CLINIC | Age: 78
End: 2018-11-28

## 2018-11-28 ENCOUNTER — APPOINTMENT (OUTPATIENT)
Dept: LAB | Facility: CLINIC | Age: 78
End: 2018-11-28
Payer: MEDICARE

## 2018-11-28 DIAGNOSIS — E09.9 DRUG OR CHEMICAL INDUCED DIABETES MELLITUS WITHOUT COMPLICATION, UNSPECIFIED WHETHER LONG TERM INSULIN USE (HCC): ICD-10-CM

## 2018-11-28 DIAGNOSIS — E09.9 DRUG OR CHEMICAL INDUCED DIABETES MELLITUS WITHOUT COMPLICATION, UNSPECIFIED WHETHER LONG TERM INSULIN USE (HCC): Primary | ICD-10-CM

## 2018-11-28 DIAGNOSIS — D64.9 ANEMIA, UNSPECIFIED TYPE: Primary | ICD-10-CM

## 2018-11-28 DIAGNOSIS — D64.9 ANEMIA, UNSPECIFIED TYPE: ICD-10-CM

## 2018-11-28 LAB
ALBUMIN SERPL BCP-MCNC: 3 G/DL (ref 3.5–5.7)
ALP SERPL-CCNC: 90 U/L (ref 46–116)
ALT SERPL W P-5'-P-CCNC: 25 U/L (ref 12–78)
ANION GAP SERPL CALCULATED.3IONS-SCNC: 8 MMOL/L (ref 4–13)
AST SERPL W P-5'-P-CCNC: 30 U/L (ref 5–45)
BILIRUB SERPL-MCNC: 0.8 MG/DL (ref 0.2–1)
BUN SERPL-MCNC: 9 MG/DL (ref 5–25)
CALCIUM SERPL-MCNC: 9 MG/DL (ref 8.3–10.1)
CHLORIDE SERPL-SCNC: 105 MMOL/L (ref 98–108)
CO2 SERPL-SCNC: 28 MMOL/L (ref 21–32)
CREAT SERPL-MCNC: 0.6 MG/DL (ref 0.6–1.3)
ERYTHROCYTE [DISTWIDTH] IN BLOOD BY AUTOMATED COUNT: 14.8 % (ref 11.6–15.1)
GFR SERPL CREATININE-BSD FRML MDRD: 88 ML/MIN/1.73SQ M
GLUCOSE SERPL-MCNC: 181 MG/DL (ref 65–140)
GRANULOCYTES NFR BLD AUTO: 80.3 % (ref 47–80)
GRANULOCYTES NFR BLD: 3.1 THOUSAND/ΜL (ref 1.85–7.82)
HCT VFR BLD AUTO: 30.2 % (ref 34.8–46.1)
HGB BLD-MCNC: 10 G/DL (ref 11.5–15.4)
LYMPHOCYTES # BLD AUTO: 0.6 THOUSANDS/ΜL (ref 0.6–4.47)
LYMPHOCYTES NFR BLD AUTO: 16 % (ref 14–44)
MCH RBC QN AUTO: 32.3 PG (ref 26.8–34.3)
MCHC RBC AUTO-ENTMCNC: 33.3 G/DL (ref 31.4–37.4)
MCV RBC AUTO: 97 FL (ref 82–98)
MONOCYTES # BLD AUTO: 0.1 THOUSAND/ΜL (ref 0.17–1.22)
MONOCYTES NFR BLD AUTO: 4 % (ref 4–12)
PLATELET # BLD AUTO: 145 THOUSANDS/UL (ref 149–390)
PMV BLD AUTO: 8.6 FL (ref 8.9–12.7)
POTASSIUM SERPL-SCNC: 3.3 MMOL/L (ref 3.5–5.3)
PROT SERPL-MCNC: 6.5 G/DL (ref 6.4–8.2)
RBC # BLD AUTO: 3.11 MILLION/UL (ref 3.81–5.12)
SODIUM SERPL-SCNC: 141 MMOL/L (ref 136–145)
TSH SERPL DL<=0.05 MIU/L-ACNC: 1.29 UIU/ML (ref 0.36–3.74)
WBC # BLD AUTO: 3.9 THOUSAND/UL (ref 4.31–10.16)
WBC NRBC COR # BLD: 3.9 THOUSAND/UL (ref 4.31–10.16)

## 2018-11-28 PROCEDURE — 84443 ASSAY THYROID STIM HORMONE: CPT

## 2018-11-28 PROCEDURE — 36415 COLL VENOUS BLD VENIPUNCTURE: CPT

## 2018-11-28 PROCEDURE — 80053 COMPREHEN METABOLIC PANEL: CPT

## 2018-11-28 PROCEDURE — 85025 COMPLETE CBC W/AUTO DIFF WBC: CPT

## 2018-11-28 RX ADMIN — Medication 300 UNITS: at 09:53

## 2018-11-28 NOTE — PLAN OF CARE
Problem: INFECTION - ADULT  Goal: Absence or prevention of progression during hospitalization  INTERVENTIONS:  - Assess and monitor for signs and symptoms of infection  - Monitor lab/diagnostic results  - Monitor all insertion sites, i e  indwelling lines, tubes, and drains  - Monitor endotracheal (as able) and nasal secretions for changes in amount and color  - [place] appropriate cooling/warming therapies per order  - Administer medications as ordered  - Instruct and encourage patient and family to use good hand hygiene technique  - Identify and instruct in appropriate isolation precautions for identified infection/condition  Outcome: Progressing    Goal: Absence of fever/infection during neutropenic period  INTERVENTIONS:  - Monitor WBC  - Implement neutropenic guidelines  Outcome: Progressing

## 2019-01-09 ENCOUNTER — TRANSCRIBE ORDERS (OUTPATIENT)
Dept: RADIATION ONCOLOGY | Facility: CLINIC | Age: 79
End: 2019-01-09

## 2019-01-09 ENCOUNTER — APPOINTMENT (OUTPATIENT)
Dept: LAB | Facility: CLINIC | Age: 79
End: 2019-01-09
Payer: MEDICARE

## 2019-01-09 ENCOUNTER — HOSPITAL ENCOUNTER (OUTPATIENT)
Dept: INFUSION CENTER | Facility: CLINIC | Age: 79
Discharge: HOME | End: 2019-01-09
Payer: MEDICARE

## 2019-01-09 DIAGNOSIS — E11.9 TYPE 2 DIABETES MELLITUS WITHOUT COMPLICATION, WITH LONG-TERM CURRENT USE OF INSULIN (HCC): Primary | ICD-10-CM

## 2019-01-09 DIAGNOSIS — C54.9 MALIGNANT NEOPLASM OF CORPUS UTERI (HCC): ICD-10-CM

## 2019-01-09 DIAGNOSIS — Z79.4 TYPE 2 DIABETES MELLITUS WITHOUT COMPLICATION, WITH LONG-TERM CURRENT USE OF INSULIN (HCC): ICD-10-CM

## 2019-01-09 DIAGNOSIS — E11.9 TYPE 2 DIABETES MELLITUS WITHOUT COMPLICATION, WITH LONG-TERM CURRENT USE OF INSULIN (HCC): ICD-10-CM

## 2019-01-09 DIAGNOSIS — C55 MALIGNANT NEOPLASM OF UTERUS (HCC): ICD-10-CM

## 2019-01-09 DIAGNOSIS — Z79.4 TYPE 2 DIABETES MELLITUS WITHOUT COMPLICATION, WITH LONG-TERM CURRENT USE OF INSULIN (HCC): Primary | ICD-10-CM

## 2019-01-09 LAB
ALBUMIN SERPL BCP-MCNC: 3.6 G/DL (ref 3.5–5.7)
ALP SERPL-CCNC: 99 U/L (ref 46–116)
ALT SERPL W P-5'-P-CCNC: 26 U/L (ref 12–78)
ANION GAP SERPL CALCULATED.3IONS-SCNC: 10 MMOL/L (ref 4–13)
AST SERPL W P-5'-P-CCNC: 28 U/L (ref 5–45)
BILIRUB SERPL-MCNC: 0.7 MG/DL (ref 0.2–1)
BUN SERPL-MCNC: 19 MG/DL (ref 5–25)
CALCIUM SERPL-MCNC: 10 MG/DL (ref 8.3–10.1)
CANCER AG125 SERPL-ACNC: 19.7 U/ML (ref 0–30)
CHLORIDE SERPL-SCNC: 107 MMOL/L (ref 98–108)
CHOLEST SERPL-MCNC: 185 MG/DL (ref 50–200)
CO2 SERPL-SCNC: 26 MMOL/L (ref 21–32)
CREAT SERPL-MCNC: 0.7 MG/DL (ref 0.6–1.3)
CREAT UR-MCNC: 98.1 MG/DL
EST. AVERAGE GLUCOSE BLD GHB EST-MCNC: 192 MG/DL
GFR SERPL CREATININE-BSD FRML MDRD: 83 ML/MIN/1.73SQ M
GLUCOSE P FAST SERPL-MCNC: 239 MG/DL (ref 65–99)
HBA1C MFR BLD: 8.3 % (ref 4.2–6.3)
HDLC SERPL-MCNC: 74 MG/DL (ref 40–60)
LDLC SERPL CALC-MCNC: 87 MG/DL (ref 0–100)
MICROALBUMIN UR-MCNC: 28.6 MG/L (ref 0–20)
MICROALBUMIN/CREAT 24H UR: 29 MG/G CREATININE (ref 0–30)
NONHDLC SERPL-MCNC: 111 MG/DL
POTASSIUM SERPL-SCNC: 3.9 MMOL/L (ref 3.5–5.3)
PROT SERPL-MCNC: 7.5 G/DL (ref 6.4–8.2)
SODIUM SERPL-SCNC: 143 MMOL/L (ref 136–145)
TRIGL SERPL-MCNC: 121 MG/DL

## 2019-01-09 PROCEDURE — 83036 HEMOGLOBIN GLYCOSYLATED A1C: CPT

## 2019-01-09 PROCEDURE — 82043 UR ALBUMIN QUANTITATIVE: CPT | Performed by: FAMILY MEDICINE

## 2019-01-09 PROCEDURE — 86304 IMMUNOASSAY TUMOR CA 125: CPT

## 2019-01-09 PROCEDURE — 80053 COMPREHEN METABOLIC PANEL: CPT

## 2019-01-09 PROCEDURE — 80061 LIPID PANEL: CPT

## 2019-01-09 PROCEDURE — 82570 ASSAY OF URINE CREATININE: CPT | Performed by: FAMILY MEDICINE

## 2019-01-09 NOTE — PROGRESS NOTES
Blood work collected via port a cath  Port flushed per protocol  Pt prefers to call and schedule her next appointment    Refused AVS

## 2019-01-22 ENCOUNTER — OFFICE VISIT (OUTPATIENT)
Dept: GYNECOLOGIC ONCOLOGY | Facility: CLINIC | Age: 79
End: 2019-01-22
Payer: MEDICARE

## 2019-01-22 VITALS
HEART RATE: 90 BPM | WEIGHT: 160 LBS | DIASTOLIC BLOOD PRESSURE: 76 MMHG | TEMPERATURE: 98.4 F | BODY MASS INDEX: 29.44 KG/M2 | SYSTOLIC BLOOD PRESSURE: 128 MMHG | HEIGHT: 62 IN | RESPIRATION RATE: 18 BRPM

## 2019-01-22 DIAGNOSIS — Z85.42 H/O CANCER OF UTERUS: ICD-10-CM

## 2019-01-22 DIAGNOSIS — L90.0 LICHEN SCLEROSUS: Primary | ICD-10-CM

## 2019-01-22 PROCEDURE — 99213 OFFICE O/P EST LOW 20 MIN: CPT | Performed by: OBSTETRICS & GYNECOLOGY

## 2019-01-22 RX ORDER — ACETAMINOPHEN 500 MG
1000 TABLET ORAL EVERY 8 HOURS PRN
COMMUNITY
Start: 2018-12-05

## 2019-01-22 RX ORDER — CLOBETASOL PROPIONATE 0.5 MG/G
OINTMENT TOPICAL 2 TIMES DAILY
Qty: 30 G | Refills: 1 | Status: SHIPPED | OUTPATIENT
Start: 2019-01-22

## 2019-01-22 NOTE — LETTER
January 22, 2019     MD [address] [address] [place] [address]    Patient: Mendel Shady   YOB: 1940   Date of Visit: 1/22/2019       Dear Dr Weaver Prior:    Thank you for referring Neil Renteria to me for evaluation  Below are my notes for this consultation  If you have questions, please do not hesitate to call me  I look forward to following your patient along with you  Sincerely,        Yobani Schulz MD        CC: No Recipients  Yobani Schulz MD  1/22/2019 10:46 AM  Sign at close encounter  Assessment/Plan:    Problem List Items Addressed This Visit        Musculoskeletal and Integument    Lichen sclerosus - Primary     Patient is symptomatic and having some itching  Gross correct risk 6 consistent with lichen sclerosus based on erythema, scaling, skin thinning and symmetric very vulvar lesion  I opted to not obtain biopsy today  I will treated with empiric high potency steroids and follow-up  Consider biopsy as necessary  There is no concern for malignancy  Relevant Medications    clobetasol (TEMOVATE) 0 05 % ointment       Other    H/O cancer of uterus     Patient has no evidence of disease  She is approaching 5 years after completion of treatment  I plan to see her back in 6 months for routine surveillance visit  She will then be seen once a year  CHIEF COMPLAINT:     Carcinosarcoma surveillance, complains of vulvar itching  Previous therapy:     H/O cancer of uterus    1/13/2014 Surgery     Robotic-assisted extensive lysis of adhesions, total hysterectomy, bilateral salpingo-oophorectomy, omentectomy, pelvic and periaortic lymphadenectomy  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 and 1 out of 15 positive pelvic lymph nodes  Positive washings             - 7/11/2014 Chemotherapy     Adjuvant chemotherapy + RT: s/p RT after 3 cycles of carbo/taxol (sandwiched plan) plus x3 cycles of post-RT chemo (sandwiched)  Completed July 11th 2014  Flexible sigmoidoscopy November 2017 (Dr Miguel Angel Mcmillan): no evidence of polyps or malignancy  Radiation changes noted  Patient ID: Sarthak Pak is a 67 y o  female  HPI  Patient has remained with no evidence of disease after treatment for uterine carcinosarcoma  Today presents for surveillance and complains of vulvar itching  Denies discharge  Denies vaginal bleeding, reports normal bowel and bladder function  The following portions of the patient's history were reviewed and updated as appropriate: allergies, current medications, past family history, past medical history, past social history, past surgical history and problem list     Review of Systems  As above  Dyspnea at baseline, unchanged  Otherwise 12 point review of systems unremarkable  Current Outpatient Prescriptions   Medication Sig Dispense Refill    acetaminophen (TYLENOL) 500 mg tablet Take 1,000 mg by mouth every 8 (eight) hours as needed      albuterol (PROVENTIL HFA,VENTOLIN HFA) 90 mcg/act inhaler Inhale 2 puffs every 6 (six) hours as needed for wheezing   aspirin 81 MG tablet Take 81 mg by mouth      atorvastatin (LIPITOR) 10 mg tablet Take 10 mg by mouth daily   diphenhydrAMINE-APAP 12 5-325 MG/15ML LIQD Take 25 mg by mouth daily      FLOVENT DISKUS 100 MCG/BLIST AEPB INHALE 1 PUFF 2 TIMES A DAY UNTIL COUGH RESOLVED  0    folic acid (FOLVITE) 1 mg tablet Take 1 mg by mouth daily   insulin glargine (LANTUS) 100 units/mL subcutaneous injection Inject 16 Units under the skin daily        lidocaine-prilocaine (EMLA) cream Apply topically      LORazepam (ATIVAN) 1 mg tablet Take 1 mg by mouth every 6 (six) hours as needed for anxiety   LOSARTAN POTASSIUM PO Take 50 mg by mouth daily        metFORMIN (GLUCOPHAGE) 500 mg tablet Take 500 mg by mouth 2 (two) times a day with meals        methotrexate 2 5 mg tablet Take 2 5 mg by mouth Pt takes 6 tabs on Wednesdays       naproxen sodium (ALEVE) 220 MG tablet Take 220 mg by mouth      omeprazole (PriLOSEC) 20 mg delayed release capsule Take 20 mg by mouth daily as needed   clobetasol (TEMOVATE) 0 05 % ointment Apply topically 2 (two) times a day For 1 week, then 3 days a week for 1 week, then once a week as needed 30 g 1     No current facility-administered medications for this visit  Objective:    Blood pressure 128/76, pulse 90, temperature 98 4 °F (36 9 °C), temperature source Oral, resp  rate 18, height 5' 2" (1 575 m), weight 72 6 kg (160 lb)  Body mass index is 29 26 kg/m²  Body surface area is 1 74 meters squared  Physical Exam   Constitutional: She is oriented to person, place, and time  She appears well-developed and well-nourished  HENT:   Head: Normocephalic and atraumatic  Neck: Normal range of motion  Neck supple  No thyromegaly present  Cardiovascular: Normal rate, regular rhythm and intact distal pulses  No murmur heard  Pulmonary/Chest: Effort normal  No respiratory distress  She has rales (Diffuse expiatory wheezing and rhonchi)  Abdominal: Soft  She exhibits no distension and no mass  There is no rebound  Genitourinary:   Genitourinary Comments: There is external symmetric erythema, skin thinning and scalingin area of itching    The bartholin's, uretheral and skenes glands are normal  The urethral meatus is normal (midline with no lesions)  Anus without fissure or lesion  Speculum exam reveals atrophic and significantly foreshortened vagina    No masses, lesions, discharge or bleeding  No significant cystocele or rectocele noted  Bimanual exam notes a surgical absent cervix, uterus and adnexal structures  No masses or fullness  Bladder is without fullness, mass or tenderness  Musculoskeletal: Normal range of motion  She exhibits no edema  Lymphadenopathy:     She has no cervical adenopathy  Neurological: She is alert and oriented to person, place, and time  Skin: Skin is warm and dry  No rash noted  Psychiatric: She has a normal mood and affect  Her behavior is normal    Vitals reviewed      Ritu Montes MD, [facility]  1/22/2019  10:46 AM          Lab Results   Component Value Date     19 7 01/09/2019     Lab Results   Component Value Date     10/28/2015    K 3 9 01/09/2019     01/09/2019    CO2 26 01/09/2019    ANIONGAP 9 10/28/2015    BUN 19 01/09/2019    CREATININE 0 70 01/09/2019    GLUCOSE 226 (H) 10/28/2015    GLUF 239 (H) 01/09/2019    CALCIUM 10 0 01/09/2019    AST 28 01/09/2019    ALT 26 01/09/2019    ALKPHOS 99 01/09/2019    PROT 6 3 (L) 10/28/2015    BILITOT 0 5 10/28/2015    EGFR 83 01/09/2019     Lab Results   Component Value Date    WBC 3 90 (L) 11/28/2018    HGB 10 0 (L) 11/28/2018    HCT 30 2 (L) 11/28/2018    MCV 97 11/28/2018     (L) 11/28/2018     Lab Results   Component Value Date    NEUTROABS 3 50 10/24/2018

## 2019-01-22 NOTE — ASSESSMENT & PLAN NOTE
Patient has no evidence of disease  She is approaching 5 years after completion of treatment  I plan to see her back in 6 months for routine surveillance visit  She will then be seen once a year

## 2019-01-22 NOTE — PROGRESS NOTES
Assessment/Plan:    Problem List Items Addressed This Visit        Musculoskeletal and Integument    Lichen sclerosus - Primary     Patient is symptomatic and having some itching  Gross correct risk 6 consistent with lichen sclerosus based on erythema, scaling, skin thinning and symmetric very vulvar lesion  I opted to not obtain biopsy today  I will treated with empiric high potency steroids and follow-up  Consider biopsy as necessary  There is no concern for malignancy  Relevant Medications    clobetasol (TEMOVATE) 0 05 % ointment       Other    H/O cancer of uterus     Patient has no evidence of disease  She is approaching 5 years after completion of treatment  I plan to see her back in 6 months for routine surveillance visit  She will then be seen once a year  CHIEF COMPLAINT:     Carcinosarcoma surveillance, complains of vulvar itching  Previous therapy:     H/O cancer of uterus    1/13/2014 Surgery     Robotic-assisted extensive lysis of adhesions, total hysterectomy, bilateral salpingo-oophorectomy, omentectomy, pelvic and periaortic lymphadenectomy  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 and 1 out of 15 positive pelvic lymph nodes  Positive washings  - 7/11/2014 Chemotherapy     Adjuvant chemotherapy + RT: s/p RT after 3 cycles of carbo/taxol (sandwiched plan) plus x3 cycles of post-RT chemo (sandwiched)  Completed July 11th 2014  Flexible sigmoidoscopy November 2017 (Dr Miguel Angel Mcmillan): no evidence of polyps or malignancy  Radiation changes noted  Patient ID: Sarthak Pak is a 67 y o  female  HPI  Patient has remained with no evidence of disease after treatment for uterine carcinosarcoma  Today presents for surveillance and complains of vulvar itching  Denies discharge  Denies vaginal bleeding, reports normal bowel and bladder function    The following portions of the patient's history were reviewed and updated as appropriate: allergies, current medications, past family history, past medical history, past social history, past surgical history and problem list     Review of Systems  As above  Dyspnea at baseline, unchanged  Otherwise 12 point review of systems unremarkable  Current Outpatient Prescriptions   Medication Sig Dispense Refill    acetaminophen (TYLENOL) 500 mg tablet Take 1,000 mg by mouth every 8 (eight) hours as needed      albuterol (PROVENTIL HFA,VENTOLIN HFA) 90 mcg/act inhaler Inhale 2 puffs every 6 (six) hours as needed for wheezing   aspirin 81 MG tablet Take 81 mg by mouth      atorvastatin (LIPITOR) 10 mg tablet Take 10 mg by mouth daily   diphenhydrAMINE-APAP 12 5-325 MG/15ML LIQD Take 25 mg by mouth daily      FLOVENT DISKUS 100 MCG/BLIST AEPB INHALE 1 PUFF 2 TIMES A DAY UNTIL COUGH RESOLVED  0    folic acid (FOLVITE) 1 mg tablet Take 1 mg by mouth daily   insulin glargine (LANTUS) 100 units/mL subcutaneous injection Inject 16 Units under the skin daily        lidocaine-prilocaine (EMLA) cream Apply topically      LORazepam (ATIVAN) 1 mg tablet Take 1 mg by mouth every 6 (six) hours as needed for anxiety   LOSARTAN POTASSIUM PO Take 50 mg by mouth daily   metFORMIN (GLUCOPHAGE) 500 mg tablet Take 500 mg by mouth 2 (two) times a day with meals        methotrexate 2 5 mg tablet Take 2 5 mg by mouth Pt takes 6 tabs on Wednesdays       naproxen sodium (ALEVE) 220 MG tablet Take 220 mg by mouth      omeprazole (PriLOSEC) 20 mg delayed release capsule Take 20 mg by mouth daily as needed   clobetasol (TEMOVATE) 0 05 % ointment Apply topically 2 (two) times a day For 1 week, then 3 days a week for 1 week, then once a week as needed 30 g 1     No current facility-administered medications for this visit              Objective:    Blood pressure 128/76, pulse 90, temperature 98 4 °F (36 9 °C), temperature source Oral, resp  rate 18, height 5' 2" (1 575 m), weight 72 6 kg (160 lb)  Body mass index is 29 26 kg/m²  Body surface area is 1 74 meters squared  Physical Exam   Constitutional: She is oriented to person, place, and time  She appears well-developed and well-nourished  HENT:   Head: Normocephalic and atraumatic  Neck: Normal range of motion  Neck supple  No thyromegaly present  Cardiovascular: Normal rate, regular rhythm and intact distal pulses  No murmur heard  Pulmonary/Chest: Effort normal  No respiratory distress  She has rales (Diffuse expiatory wheezing and rhonchi)  Abdominal: Soft  She exhibits no distension and no mass  There is no rebound  Genitourinary:   Genitourinary Comments: There is external symmetric erythema, skin thinning and scalingin area of itching    The bartholin's, uretheral and skenes glands are normal  The urethral meatus is normal (midline with no lesions)  Anus without fissure or lesion  Speculum exam reveals atrophic and significantly foreshortened vagina    No masses, lesions, discharge or bleeding  No significant cystocele or rectocele noted  Bimanual exam notes a surgical absent cervix, uterus and adnexal structures  No masses or fullness  Bladder is without fullness, mass or tenderness  Musculoskeletal: Normal range of motion  She exhibits no edema  Lymphadenopathy:     She has no cervical adenopathy  Neurological: She is alert and oriented to person, place, and time  Skin: Skin is warm and dry  No rash noted  Psychiatric: She has a normal mood and affect  Her behavior is normal    Vitals reviewed      Dasha Andersen MD, [place], [place]  1/22/2019  10:46 AM          Lab Results   Component Value Date     19 7 01/09/2019     Lab Results   Component Value Date     10/28/2015    K 3 9 01/09/2019     01/09/2019    CO2 26 01/09/2019    ANIONGAP 9 10/28/2015    BUN 19 01/09/2019    CREATININE 0 70 01/09/2019    GLUCOSE 226 (H) 10/28/2015 GLUF 239 (H) 01/09/2019    CALCIUM 10 0 01/09/2019    AST 28 01/09/2019    ALT 26 01/09/2019    ALKPHOS 99 01/09/2019    PROT 6 3 (L) 10/28/2015    BILITOT 0 5 10/28/2015    EGFR 83 01/09/2019     Lab Results   Component Value Date    WBC 3 90 (L) 11/28/2018    HGB 10 0 (L) 11/28/2018    HCT 30 2 (L) 11/28/2018    MCV 97 11/28/2018     (L) 11/28/2018     Lab Results   Component Value Date    NEUTROABS 3 50 10/24/2018

## 2019-01-22 NOTE — ASSESSMENT & PLAN NOTE
Patient is symptomatic and having some itching  Gross correct risk 6 consistent with lichen sclerosus based on erythema, scaling, skin thinning and symmetric very vulvar lesion  I opted to not obtain biopsy today  I will treated with empiric high potency steroids and follow-up  Consider biopsy as necessary  There is no concern for malignancy

## 2019-04-02 ENCOUNTER — TRANSCRIBE ORDERS (OUTPATIENT)
Dept: RADIATION ONCOLOGY | Facility: CLINIC | Age: 79
End: 2019-04-02

## 2019-04-02 ENCOUNTER — HOSPITAL ENCOUNTER (OUTPATIENT)
Dept: INFUSION CENTER | Facility: CLINIC | Age: 79
Discharge: HOME | End: 2019-04-02
Payer: MEDICARE

## 2019-04-02 ENCOUNTER — APPOINTMENT (OUTPATIENT)
Dept: LAB | Facility: CLINIC | Age: 79
End: 2019-04-02
Payer: MEDICARE

## 2019-04-02 DIAGNOSIS — Z51.81 ENCOUNTER FOR THERAPEUTIC DRUG MONITORING: ICD-10-CM

## 2019-04-02 DIAGNOSIS — Z51.81 ENCOUNTER FOR THERAPEUTIC DRUG MONITORING: Primary | ICD-10-CM

## 2019-04-02 LAB
ALBUMIN SERPL BCP-MCNC: 3.2 G/DL (ref 3.5–5)
ALT SERPL W P-5'-P-CCNC: 20 U/L (ref 12–78)
AST SERPL W P-5'-P-CCNC: 24 U/L (ref 5–45)
BASOPHILS # BLD AUTO: 0.01 THOUSANDS/ΜL (ref 0–0.1)
BASOPHILS NFR BLD AUTO: 0 % (ref 0–1)
CREAT SERPL-MCNC: 0.64 MG/DL (ref 0.6–1.3)
EOSINOPHIL # BLD AUTO: 0.19 THOUSAND/ΜL (ref 0–0.61)
EOSINOPHIL NFR BLD AUTO: 4 % (ref 0–6)
ERYTHROCYTE [DISTWIDTH] IN BLOOD BY AUTOMATED COUNT: 15.8 % (ref 11.6–15.1)
ERYTHROCYTE [SEDIMENTATION RATE] IN BLOOD: 42 MM/HOUR (ref 0–20)
GFR SERPL CREATININE-BSD FRML MDRD: 86 ML/MIN/1.73SQ M
HCT VFR BLD AUTO: 28.4 % (ref 34.8–46.1)
HGB BLD-MCNC: 9.2 G/DL (ref 11.5–15.4)
LYMPHOCYTES # BLD AUTO: 0.72 THOUSANDS/ΜL (ref 0.6–4.47)
LYMPHOCYTES NFR BLD AUTO: 16 % (ref 14–44)
MCH RBC QN AUTO: 31.8 PG (ref 26.8–34.3)
MCHC RBC AUTO-ENTMCNC: 32.4 G/DL (ref 31.4–37.4)
MCV RBC AUTO: 98 FL (ref 82–98)
MONOCYTES # BLD AUTO: 0.46 THOUSAND/ΜL (ref 0.17–1.22)
MONOCYTES NFR BLD AUTO: 10 % (ref 4–12)
NEUTROPHILS # BLD AUTO: 3.21 THOUSANDS/ΜL (ref 1.85–7.62)
NEUTS SEG NFR BLD AUTO: 70 % (ref 43–75)
PLATELET # BLD AUTO: 148 THOUSANDS/UL (ref 149–390)
PMV BLD AUTO: 11.2 FL (ref 8.9–12.7)
RBC # BLD AUTO: 2.89 MILLION/UL (ref 3.81–5.12)
WBC # BLD AUTO: 4.59 THOUSAND/UL (ref 4.31–10.16)

## 2019-04-02 PROCEDURE — 85652 RBC SED RATE AUTOMATED: CPT

## 2019-04-02 PROCEDURE — 36415 COLL VENOUS BLD VENIPUNCTURE: CPT

## 2019-04-02 PROCEDURE — 82565 ASSAY OF CREATININE: CPT

## 2019-04-02 PROCEDURE — 84450 TRANSFERASE (AST) (SGOT): CPT

## 2019-04-02 PROCEDURE — 82040 ASSAY OF SERUM ALBUMIN: CPT

## 2019-04-02 PROCEDURE — 84460 ALANINE AMINO (ALT) (SGPT): CPT

## 2019-04-02 PROCEDURE — 85025 COMPLETE CBC W/AUTO DIFF WBC: CPT

## 2019-04-02 RX ORDER — INSULIN GLARGINE 100 [IU]/ML
36 INJECTION, SOLUTION SUBCUTANEOUS DAILY
COMMUNITY

## 2019-04-02 NOTE — PROGRESS NOTES
[facility]'s PAC [facility]  Pt tolerated well  Next visit scheduled, AVS provided

## 2019-04-29 PROBLEM — Z45.2 ENCOUNTER FOR INSERTION OF VENOUS ACCESS PORT: Status: ACTIVE | Noted: 2019-04-29

## 2019-05-28 ENCOUNTER — HOSPITAL ENCOUNTER (OUTPATIENT)
Dept: INFUSION CENTER | Facility: CLINIC | Age: 79
Discharge: HOME | End: 2019-05-28

## 2019-06-11 ENCOUNTER — TRANSCRIBE ORDERS (OUTPATIENT)
Dept: RADIATION ONCOLOGY | Facility: CLINIC | Age: 79
End: 2019-06-11

## 2019-06-11 ENCOUNTER — APPOINTMENT (OUTPATIENT)
Dept: LAB | Facility: CLINIC | Age: 79
End: 2019-06-11
Payer: MEDICARE

## 2019-06-11 ENCOUNTER — HOSPITAL ENCOUNTER (OUTPATIENT)
Dept: INFUSION CENTER | Facility: CLINIC | Age: 79
Discharge: HOME | End: 2019-06-11
Payer: MEDICARE

## 2019-06-11 DIAGNOSIS — Z45.2 ENCOUNTER FOR INSERTION OF VENOUS ACCESS PORT: Primary | ICD-10-CM

## 2019-06-11 DIAGNOSIS — Z51.81 ENCOUNTER FOR THERAPEUTIC DRUG MONITORING: Primary | ICD-10-CM

## 2019-06-11 DIAGNOSIS — E11.8 TYPE 2 DIABETES MELLITUS WITH COMPLICATION, UNSPECIFIED WHETHER LONG TERM INSULIN USE: ICD-10-CM

## 2019-06-11 DIAGNOSIS — L90.0 LICHEN SCLEROSUS: ICD-10-CM

## 2019-06-11 DIAGNOSIS — E11.8 TYPE 2 DIABETES MELLITUS WITH COMPLICATION, UNSPECIFIED WHETHER LONG TERM INSULIN USE: Primary | ICD-10-CM

## 2019-06-11 DIAGNOSIS — Z51.81 ENCOUNTER FOR THERAPEUTIC DRUG MONITORING: ICD-10-CM

## 2019-06-11 LAB
ALBUMIN SERPL BCP-MCNC: 3.1 G/DL (ref 3.5–5.7)
ALP SERPL-CCNC: 102 U/L (ref 46–116)
ALT SERPL W P-5'-P-CCNC: 24 U/L (ref 12–78)
ANION GAP SERPL CALCULATED.3IONS-SCNC: 10 MMOL/L (ref 4–13)
AST SERPL W P-5'-P-CCNC: 37 U/L (ref 5–45)
BASOPHILS # BLD AUTO: 0.01 THOUSANDS/ΜL (ref 0–0.1)
BASOPHILS NFR BLD AUTO: 0 % (ref 0–1)
BILIRUB SERPL-MCNC: 0.7 MG/DL (ref 0.2–1)
BUN SERPL-MCNC: 10 MG/DL (ref 5–25)
CALCIUM SERPL-MCNC: 8.8 MG/DL (ref 8.3–10.1)
CHLORIDE SERPL-SCNC: 105 MMOL/L (ref 98–108)
CO2 SERPL-SCNC: 27 MMOL/L (ref 21–32)
CREAT SERPL-MCNC: 0.6 MG/DL (ref 0.6–1.3)
CREAT SERPL-MCNC: 0.66 MG/DL (ref 0.6–1.3)
EOSINOPHIL # BLD AUTO: 0.24 THOUSAND/ΜL (ref 0–0.61)
EOSINOPHIL NFR BLD AUTO: 4 % (ref 0–6)
ERYTHROCYTE [DISTWIDTH] IN BLOOD BY AUTOMATED COUNT: 17 % (ref 11.6–15.1)
ERYTHROCYTE [SEDIMENTATION RATE] IN BLOOD: 48 MM/HOUR (ref 0–20)
GFR SERPL CREATININE-BSD FRML MDRD: 85 ML/MIN/1.73SQ M
GFR SERPL CREATININE-BSD FRML MDRD: 88 ML/MIN/1.73SQ M
GLUCOSE SERPL-MCNC: 155 MG/DL (ref 65–140)
HCT VFR BLD AUTO: 25.5 % (ref 34.8–46.1)
HGB BLD-MCNC: 8.2 G/DL (ref 11.5–15.4)
LYMPHOCYTES # BLD AUTO: 0.87 THOUSANDS/ΜL (ref 0.6–4.47)
LYMPHOCYTES NFR BLD AUTO: 16 % (ref 14–44)
MCH RBC QN AUTO: 31.8 PG (ref 26.8–34.3)
MCHC RBC AUTO-ENTMCNC: 32.2 G/DL (ref 31.4–37.4)
MCV RBC AUTO: 99 FL (ref 82–98)
MONOCYTES # BLD AUTO: 0.54 THOUSAND/ΜL (ref 0.17–1.22)
MONOCYTES NFR BLD AUTO: 10 % (ref 4–12)
NEUTROPHILS # BLD AUTO: 3.86 THOUSANDS/ΜL (ref 1.85–7.62)
NEUTS SEG NFR BLD AUTO: 70 % (ref 43–75)
PLATELET # BLD AUTO: 181 THOUSANDS/UL (ref 149–390)
PMV BLD AUTO: 11 FL (ref 8.9–12.7)
POTASSIUM SERPL-SCNC: 3.6 MMOL/L (ref 3.5–5.3)
PROT SERPL-MCNC: 6.3 G/DL (ref 6.4–8.2)
RBC # BLD AUTO: 2.58 MILLION/UL (ref 3.81–5.12)
SODIUM SERPL-SCNC: 142 MMOL/L (ref 136–145)
WBC # BLD AUTO: 5.52 THOUSAND/UL (ref 4.31–10.16)

## 2019-06-11 PROCEDURE — 80053 COMPREHEN METABOLIC PANEL: CPT

## 2019-06-11 PROCEDURE — 36415 COLL VENOUS BLD VENIPUNCTURE: CPT

## 2019-06-11 PROCEDURE — 85652 RBC SED RATE AUTOMATED: CPT

## 2019-06-11 PROCEDURE — 83036 HEMOGLOBIN GLYCOSYLATED A1C: CPT

## 2019-06-11 PROCEDURE — 85025 COMPLETE CBC W/AUTO DIFF WBC: CPT

## 2019-06-11 PROCEDURE — 84378 SUGARS SINGLE QUANT: CPT

## 2019-06-11 PROCEDURE — 82565 ASSAY OF CREATININE: CPT

## 2019-06-11 NOTE — PROGRESS NOTES
Pt tolerated central labs and port flush without any incident  Pt given AVS and aware of next appointment

## 2019-06-11 NOTE — PLAN OF CARE
Problem: Potential for Falls  Goal: Patient will remain free of falls  Description  INTERVENTIONS:  - Assess patient frequently for physical needs  -  Identify cognitive and physical deficits and behaviors that affect risk of falls    -  Ernul fall precautions as indicated by assessment   - Educate patient/family on patient safety including physical limitations  - Instruct patient to call for assistance with activity based on assessment  - Modify environment to reduce risk of injury  - Consider OT/PT consult to assist with strengthening/mobility  Outcome: Progressing

## 2019-06-15 ENCOUNTER — APPOINTMENT (EMERGENCY)
Dept: CT IMAGING | Facility: HOSPITAL | Age: 79
DRG: 394 | End: 2019-06-15
Payer: MEDICARE

## 2019-06-15 ENCOUNTER — HOSPITAL ENCOUNTER (INPATIENT)
Facility: HOSPITAL | Age: 79
LOS: 3 days | Discharge: HOME | DRG: 394 | End: 2019-06-18
Attending: EMERGENCY MEDICINE | Admitting: INTERNAL MEDICINE
Payer: MEDICARE

## 2019-06-15 ENCOUNTER — APPOINTMENT (EMERGENCY)
Dept: RADIOLOGY | Facility: HOSPITAL | Age: 79
DRG: 394 | End: 2019-06-15
Payer: MEDICARE

## 2019-06-15 DIAGNOSIS — K62.5 RECTAL BLEEDING: Primary | ICD-10-CM

## 2019-06-15 DIAGNOSIS — D62 ACUTE BLOOD LOSS ANEMIA: ICD-10-CM

## 2019-06-15 PROBLEM — D64.9 SYMPTOMATIC ANEMIA: Status: ACTIVE | Noted: 2019-06-15

## 2019-06-15 PROBLEM — I10 HYPERTENSION: Status: ACTIVE | Noted: 2019-06-15

## 2019-06-15 PROBLEM — R19.7 BLOODY DIARRHEA: Status: ACTIVE | Noted: 2019-06-15

## 2019-06-15 LAB
ABO GROUP BLD: NORMAL
ALBUMIN SERPL BCP-MCNC: 2.9 G/DL (ref 3.5–5)
ALP SERPL-CCNC: 116 U/L (ref 46–116)
ALT SERPL W P-5'-P-CCNC: 18 U/L (ref 12–78)
ANION GAP SERPL CALCULATED.3IONS-SCNC: 10 MMOL/L (ref 4–13)
APTT PPP: 29 SECONDS (ref 26–38)
AST SERPL W P-5'-P-CCNC: 25 U/L (ref 5–45)
BASOPHILS # BLD AUTO: 0.01 THOUSANDS/ΜL (ref 0–0.1)
BASOPHILS NFR BLD AUTO: 0 % (ref 0–1)
BILIRUB SERPL-MCNC: 0.35 MG/DL (ref 0.2–1)
BLD GP AB SCN SERPL QL: NEGATIVE
BUN SERPL-MCNC: 13 MG/DL (ref 5–25)
CALCIUM SERPL-MCNC: 8.6 MG/DL (ref 8.3–10.1)
CHLORIDE SERPL-SCNC: 107 MMOL/L (ref 100–108)
CO2 SERPL-SCNC: 25 MMOL/L (ref 21–32)
CREAT SERPL-MCNC: 0.64 MG/DL (ref 0.6–1.3)
EOSINOPHIL # BLD AUTO: 0.16 THOUSAND/ΜL (ref 0–0.61)
EOSINOPHIL NFR BLD AUTO: 3 % (ref 0–6)
ERYTHROCYTE [DISTWIDTH] IN BLOOD BY AUTOMATED COUNT: 16.8 % (ref 11.6–15.1)
GFR SERPL CREATININE-BSD FRML MDRD: 86 ML/MIN/1.73SQ M
GLUCOSE SERPL-MCNC: 103 MG/DL (ref 65–140)
GLUCOSE SERPL-MCNC: 127 MG/DL (ref 65–140)
GLUCOSE SERPL-MCNC: 166 MG/DL (ref 65–140)
HCT VFR BLD AUTO: 24.7 % (ref 34.8–46.1)
HCT VFR BLD AUTO: 28.2 % (ref 34.8–46.1)
HGB BLD-MCNC: 7.9 G/DL (ref 11.5–15.4)
HGB BLD-MCNC: 8.9 G/DL (ref 11.5–15.4)
IMM GRANULOCYTES # BLD AUTO: 0.03 THOUSAND/UL (ref 0–0.2)
IMM GRANULOCYTES NFR BLD AUTO: 1 % (ref 0–2)
INR PPP: 1.18 (ref 0.86–1.17)
LYMPHOCYTES # BLD AUTO: 1.01 THOUSANDS/ΜL (ref 0.6–4.47)
LYMPHOCYTES NFR BLD AUTO: 17 % (ref 14–44)
MCH RBC QN AUTO: 32.5 PG (ref 26.8–34.3)
MCHC RBC AUTO-ENTMCNC: 32 G/DL (ref 31.4–37.4)
MCV RBC AUTO: 102 FL (ref 82–98)
MONOCYTES # BLD AUTO: 0.66 THOUSAND/ΜL (ref 0.17–1.22)
MONOCYTES NFR BLD AUTO: 11 % (ref 4–12)
NEUTROPHILS # BLD AUTO: 4.17 THOUSANDS/ΜL (ref 1.85–7.62)
NEUTS SEG NFR BLD AUTO: 68 % (ref 43–75)
NRBC BLD AUTO-RTO: 0 /100 WBCS
NT-PROBNP SERPL-MCNC: 213 PG/ML
PLATELET # BLD AUTO: 150 THOUSANDS/UL (ref 149–390)
PLATELET # BLD AUTO: 161 THOUSANDS/UL (ref 149–390)
PMV BLD AUTO: 10.9 FL (ref 8.9–12.7)
PMV BLD AUTO: 11 FL (ref 8.9–12.7)
POTASSIUM SERPL-SCNC: 3.5 MMOL/L (ref 3.5–5.3)
PROT SERPL-MCNC: 6.6 G/DL (ref 6.4–8.2)
PROTHROMBIN TIME: 15.1 SECONDS (ref 11.8–14.2)
RBC # BLD AUTO: 2.43 MILLION/UL (ref 3.81–5.12)
RH BLD: NEGATIVE
SODIUM SERPL-SCNC: 142 MMOL/L (ref 136–145)
SPECIMEN EXPIRATION DATE: NORMAL
TROPONIN I SERPL-MCNC: 0.02 NG/ML
WBC # BLD AUTO: 6.04 THOUSAND/UL (ref 4.31–10.16)

## 2019-06-15 PROCEDURE — 74177 CT ABD & PELVIS W/CONTRAST: CPT

## 2019-06-15 PROCEDURE — 86901 BLOOD TYPING SEROLOGIC RH(D): CPT | Performed by: EMERGENCY MEDICINE

## 2019-06-15 PROCEDURE — 86900 BLOOD TYPING SEROLOGIC ABO: CPT | Performed by: EMERGENCY MEDICINE

## 2019-06-15 PROCEDURE — 84484 ASSAY OF TROPONIN QUANT: CPT | Performed by: EMERGENCY MEDICINE

## 2019-06-15 PROCEDURE — 99223 1ST HOSP IP/OBS HIGH 75: CPT | Performed by: INTERNAL MEDICINE

## 2019-06-15 PROCEDURE — 85025 COMPLETE CBC W/AUTO DIFF WBC: CPT | Performed by: EMERGENCY MEDICINE

## 2019-06-15 PROCEDURE — 36415 COLL VENOUS BLD VENIPUNCTURE: CPT | Performed by: EMERGENCY MEDICINE

## 2019-06-15 PROCEDURE — 86923 COMPATIBILITY TEST ELECTRIC: CPT

## 2019-06-15 PROCEDURE — 85049 AUTOMATED PLATELET COUNT: CPT | Performed by: INTERNAL MEDICINE

## 2019-06-15 PROCEDURE — 99284 EMERGENCY DEPT VISIT MOD MDM: CPT | Performed by: EMERGENCY MEDICINE

## 2019-06-15 PROCEDURE — 85014 HEMATOCRIT: CPT | Performed by: INTERNAL MEDICINE

## 2019-06-15 PROCEDURE — 99285 EMERGENCY DEPT VISIT HI MDM: CPT

## 2019-06-15 PROCEDURE — P9040 RBC LEUKOREDUCED IRRADIATED: HCPCS

## 2019-06-15 PROCEDURE — 86850 RBC ANTIBODY SCREEN: CPT | Performed by: EMERGENCY MEDICINE

## 2019-06-15 PROCEDURE — 93005 ELECTROCARDIOGRAM TRACING: CPT

## 2019-06-15 PROCEDURE — 85018 HEMOGLOBIN: CPT | Performed by: INTERNAL MEDICINE

## 2019-06-15 PROCEDURE — 85730 THROMBOPLASTIN TIME PARTIAL: CPT | Performed by: EMERGENCY MEDICINE

## 2019-06-15 PROCEDURE — 83880 ASSAY OF NATRIURETIC PEPTIDE: CPT | Performed by: EMERGENCY MEDICINE

## 2019-06-15 PROCEDURE — 85610 PROTHROMBIN TIME: CPT | Performed by: EMERGENCY MEDICINE

## 2019-06-15 PROCEDURE — 80053 COMPREHEN METABOLIC PANEL: CPT | Performed by: EMERGENCY MEDICINE

## 2019-06-15 PROCEDURE — P9016 RBC LEUKOCYTES REDUCED: HCPCS

## 2019-06-15 PROCEDURE — 82948 REAGENT STRIP/BLOOD GLUCOSE: CPT

## 2019-06-15 PROCEDURE — 30233N1 TRANSFUSION OF NONAUTOLOGOUS RED BLOOD CELLS INTO PERIPHERAL VEIN, PERCUTANEOUS APPROACH: ICD-10-PCS | Performed by: INTERNAL MEDICINE

## 2019-06-15 PROCEDURE — 71046 X-RAY EXAM CHEST 2 VIEWS: CPT

## 2019-06-15 RX ORDER — GABAPENTIN 100 MG/1
100 CAPSULE ORAL
COMMUNITY

## 2019-06-15 RX ORDER — FERROUS SULFATE 325(65) MG
325 TABLET ORAL
Status: DISCONTINUED | OUTPATIENT
Start: 2019-06-16 | End: 2019-06-18 | Stop reason: HOSPADM

## 2019-06-15 RX ORDER — INSULIN GLARGINE 100 [IU]/ML
16 INJECTION, SOLUTION SUBCUTANEOUS
Status: DISCONTINUED | OUTPATIENT
Start: 2019-06-15 | End: 2019-06-15

## 2019-06-15 RX ORDER — ALBUTEROL SULFATE 90 UG/1
2 AEROSOL, METERED RESPIRATORY (INHALATION) EVERY 6 HOURS PRN
Status: DISCONTINUED | OUTPATIENT
Start: 2019-06-15 | End: 2019-06-18 | Stop reason: HOSPADM

## 2019-06-15 RX ORDER — FERROUS SULFATE 325(65) MG
325 TABLET ORAL
COMMUNITY

## 2019-06-15 RX ORDER — HEPARIN SODIUM 5000 [USP'U]/ML
5000 INJECTION, SOLUTION INTRAVENOUS; SUBCUTANEOUS EVERY 8 HOURS SCHEDULED
Status: DISCONTINUED | OUTPATIENT
Start: 2019-06-15 | End: 2019-06-18 | Stop reason: HOSPADM

## 2019-06-15 RX ORDER — PANTOPRAZOLE SODIUM 20 MG/1
20 TABLET, DELAYED RELEASE ORAL DAILY
Status: DISCONTINUED | OUTPATIENT
Start: 2019-06-15 | End: 2019-06-18 | Stop reason: HOSPADM

## 2019-06-15 RX ORDER — LORAZEPAM 1 MG/1
0.5 TABLET ORAL 2 TIMES DAILY PRN
Status: DISCONTINUED | OUTPATIENT
Start: 2019-06-15 | End: 2019-06-18 | Stop reason: HOSPADM

## 2019-06-15 RX ORDER — GABAPENTIN 100 MG/1
100 CAPSULE ORAL
Status: DISCONTINUED | OUTPATIENT
Start: 2019-06-15 | End: 2019-06-18 | Stop reason: HOSPADM

## 2019-06-15 RX ORDER — ACETAMINOPHEN 325 MG/1
975 TABLET ORAL EVERY 8 HOURS PRN
Status: DISCONTINUED | OUTPATIENT
Start: 2019-06-15 | End: 2019-06-18 | Stop reason: HOSPADM

## 2019-06-15 RX ORDER — PANTOPRAZOLE SODIUM 20 MG/1
20 TABLET, DELAYED RELEASE ORAL DAILY
COMMUNITY

## 2019-06-15 RX ORDER — FOLIC ACID 1 MG/1
1 TABLET ORAL DAILY
Status: DISCONTINUED | OUTPATIENT
Start: 2019-06-15 | End: 2019-06-18 | Stop reason: HOSPADM

## 2019-06-15 RX ORDER — INSULIN GLARGINE 100 [IU]/ML
20 INJECTION, SOLUTION SUBCUTANEOUS DAILY
Status: DISCONTINUED | OUTPATIENT
Start: 2019-06-16 | End: 2019-06-18 | Stop reason: HOSPADM

## 2019-06-15 RX ORDER — INSULIN GLARGINE 100 [IU]/ML
10 INJECTION, SOLUTION SUBCUTANEOUS
Status: DISCONTINUED | OUTPATIENT
Start: 2019-06-15 | End: 2019-06-18 | Stop reason: HOSPADM

## 2019-06-15 RX ORDER — FLUTICASONE PROPIONATE 110 UG/1
1 AEROSOL, METERED RESPIRATORY (INHALATION) EVERY 12 HOURS SCHEDULED
Status: DISCONTINUED | OUTPATIENT
Start: 2019-06-15 | End: 2019-06-18 | Stop reason: HOSPADM

## 2019-06-15 RX ORDER — ASPIRIN 81 MG/1
81 TABLET ORAL DAILY
Status: DISCONTINUED | OUTPATIENT
Start: 2019-06-15 | End: 2019-06-18 | Stop reason: HOSPADM

## 2019-06-15 RX ORDER — METFORMIN HYDROCHLORIDE 500 MG/1
500 TABLET, EXTENDED RELEASE ORAL
COMMUNITY

## 2019-06-15 RX ORDER — LORAZEPAM 1 MG/1
0.5 TABLET ORAL ONCE
Status: COMPLETED | OUTPATIENT
Start: 2019-06-15 | End: 2019-06-15

## 2019-06-15 RX ORDER — ATORVASTATIN CALCIUM 40 MG/1
40 TABLET, FILM COATED ORAL DAILY
Status: DISCONTINUED | OUTPATIENT
Start: 2019-06-15 | End: 2019-06-18 | Stop reason: HOSPADM

## 2019-06-15 RX ORDER — LOSARTAN POTASSIUM 50 MG/1
50 TABLET ORAL DAILY
Status: DISCONTINUED | OUTPATIENT
Start: 2019-06-15 | End: 2019-06-18 | Stop reason: HOSPADM

## 2019-06-15 RX ORDER — INSULIN GLARGINE 100 [IU]/ML
40 INJECTION, SOLUTION SUBCUTANEOUS DAILY
Status: DISCONTINUED | OUTPATIENT
Start: 2019-06-15 | End: 2019-06-15

## 2019-06-15 RX ADMIN — LORAZEPAM 0.5 MG: 1 TABLET ORAL at 22:08

## 2019-06-15 RX ADMIN — IOHEXOL 100 ML: 350 INJECTION, SOLUTION INTRAVENOUS at 12:27

## 2019-06-15 RX ADMIN — LORAZEPAM 0.5 MG: 1 TABLET ORAL at 15:40

## 2019-06-15 RX ADMIN — HEPARIN SODIUM 5000 UNITS: 5000 INJECTION INTRAVENOUS; SUBCUTANEOUS at 22:09

## 2019-06-15 RX ADMIN — FLUTICASONE PROPIONATE 1 PUFF: 110 AEROSOL, METERED RESPIRATORY (INHALATION) at 15:41

## 2019-06-16 LAB
ABO GROUP BLD BPU: NORMAL
ABO GROUP BLD BPU: NORMAL
ALBUMIN SERPL BCP-MCNC: 2.9 G/DL (ref 3.5–5)
ALP SERPL-CCNC: 117 U/L (ref 46–116)
ALT SERPL W P-5'-P-CCNC: 17 U/L (ref 12–78)
ANION GAP SERPL CALCULATED.3IONS-SCNC: 7 MMOL/L (ref 4–13)
AST SERPL W P-5'-P-CCNC: 28 U/L (ref 5–45)
ATRIAL RATE: 101 BPM
ATRIAL RATE: 227 BPM
ATRIAL RATE: 81 BPM
ATRIAL RATE: 86 BPM
BILIRUB SERPL-MCNC: 2.24 MG/DL (ref 0.2–1)
BPU ID: NORMAL
BPU ID: NORMAL
BUN SERPL-MCNC: 8 MG/DL (ref 5–25)
CALCIUM SERPL-MCNC: 9 MG/DL (ref 8.3–10.1)
CHLORIDE SERPL-SCNC: 109 MMOL/L (ref 100–108)
CO2 SERPL-SCNC: 29 MMOL/L (ref 21–32)
CREAT SERPL-MCNC: 0.66 MG/DL (ref 0.6–1.3)
CROSSMATCH: NORMAL
CROSSMATCH: NORMAL
ERYTHROCYTE [DISTWIDTH] IN BLOOD BY AUTOMATED COUNT: 19.8 % (ref 11.6–15.1)
FERRITIN SERPL-MCNC: 30 NG/ML (ref 8–388)
GFR SERPL CREATININE-BSD FRML MDRD: 85 ML/MIN/1.73SQ M
GLUCOSE SERPL-MCNC: 105 MG/DL (ref 65–140)
GLUCOSE SERPL-MCNC: 169 MG/DL (ref 65–140)
GLUCOSE SERPL-MCNC: 68 MG/DL (ref 65–140)
HCT VFR BLD AUTO: 31.6 % (ref 34.8–46.1)
HCT VFR BLD AUTO: 32.6 % (ref 34.8–46.1)
HGB BLD-MCNC: 10.2 G/DL (ref 11.5–15.4)
HGB BLD-MCNC: 10.7 G/DL (ref 11.5–15.4)
IRON SATN MFR SERPL: 99 %
IRON SERPL-MCNC: 297 UG/DL (ref 50–170)
LACTATE SERPL-SCNC: 1.4 MMOL/L (ref 0.5–2)
MCH RBC QN AUTO: 30.9 PG (ref 26.8–34.3)
MCHC RBC AUTO-ENTMCNC: 32.3 G/DL (ref 31.4–37.4)
MCV RBC AUTO: 96 FL (ref 82–98)
P AXIS: 42 DEGREES
P AXIS: 47 DEGREES
P AXIS: 48 DEGREES
PLATELET # BLD AUTO: 156 THOUSANDS/UL (ref 149–390)
PMV BLD AUTO: 10.8 FL (ref 8.9–12.7)
POTASSIUM SERPL-SCNC: 3.6 MMOL/L (ref 3.5–5.3)
PR INTERVAL: 144 MS
PR INTERVAL: 144 MS
PR INTERVAL: 162 MS
PROCALCITONIN SERPL-MCNC: 0.13 NG/ML
PROT SERPL-MCNC: 6.5 G/DL (ref 6.4–8.2)
QRS AXIS: -15 DEGREES
QRS AXIS: -16 DEGREES
QRS AXIS: -23 DEGREES
QRS AXIS: -24 DEGREES
QRSD INTERVAL: 70 MS
QRSD INTERVAL: 70 MS
QRSD INTERVAL: 76 MS
QRSD INTERVAL: 76 MS
QT INTERVAL: 344 MS
QT INTERVAL: 346 MS
QT INTERVAL: 356 MS
QT INTERVAL: 362 MS
QTC INTERVAL: 433 MS
QTC INTERVAL: 437 MS
QTC INTERVAL: 446 MS
QTC INTERVAL: 456 MS
RBC # BLD AUTO: 3.3 MILLION/UL (ref 3.81–5.12)
SODIUM SERPL-SCNC: 145 MMOL/L (ref 136–145)
T WAVE AXIS: 72 DEGREES
T WAVE AXIS: 79 DEGREES
T WAVE AXIS: 93 DEGREES
T WAVE AXIS: 98 DEGREES
TIBC SERPL-MCNC: 300 UG/DL (ref 250–450)
UNIT DISPENSE STATUS: NORMAL
UNIT DISPENSE STATUS: NORMAL
UNIT PRODUCT CODE: NORMAL
UNIT PRODUCT CODE: NORMAL
UNIT RH: NORMAL
UNIT RH: NORMAL
VENTRICULAR RATE: 101 BPM
VENTRICULAR RATE: 86 BPM
VENTRICULAR RATE: 96 BPM
VENTRICULAR RATE: 99 BPM
WBC # BLD AUTO: 5.24 THOUSAND/UL (ref 4.31–10.16)

## 2019-06-16 PROCEDURE — 93010 ELECTROCARDIOGRAM REPORT: CPT | Performed by: INTERNAL MEDICINE

## 2019-06-16 PROCEDURE — 85014 HEMATOCRIT: CPT | Performed by: INTERNAL MEDICINE

## 2019-06-16 PROCEDURE — 84145 PROCALCITONIN (PCT): CPT | Performed by: INTERNAL MEDICINE

## 2019-06-16 PROCEDURE — 85027 COMPLETE CBC AUTOMATED: CPT | Performed by: INTERNAL MEDICINE

## 2019-06-16 PROCEDURE — 82948 REAGENT STRIP/BLOOD GLUCOSE: CPT

## 2019-06-16 PROCEDURE — 82728 ASSAY OF FERRITIN: CPT | Performed by: NURSE PRACTITIONER

## 2019-06-16 PROCEDURE — 85018 HEMOGLOBIN: CPT | Performed by: INTERNAL MEDICINE

## 2019-06-16 PROCEDURE — 99232 SBSQ HOSP IP/OBS MODERATE 35: CPT | Performed by: INTERNAL MEDICINE

## 2019-06-16 PROCEDURE — 83540 ASSAY OF IRON: CPT | Performed by: NURSE PRACTITIONER

## 2019-06-16 PROCEDURE — 87493 C DIFF AMPLIFIED PROBE: CPT | Performed by: INTERNAL MEDICINE

## 2019-06-16 PROCEDURE — 80053 COMPREHEN METABOLIC PANEL: CPT | Performed by: INTERNAL MEDICINE

## 2019-06-16 PROCEDURE — 83605 ASSAY OF LACTIC ACID: CPT | Performed by: NURSE PRACTITIONER

## 2019-06-16 PROCEDURE — 83550 IRON BINDING TEST: CPT | Performed by: NURSE PRACTITIONER

## 2019-06-16 RX ADMIN — FLUTICASONE PROPIONATE 1 PUFF: 110 AEROSOL, METERED RESPIRATORY (INHALATION) at 20:27

## 2019-06-16 RX ADMIN — HEPARIN SODIUM 5000 UNITS: 5000 INJECTION INTRAVENOUS; SUBCUTANEOUS at 22:06

## 2019-06-16 RX ADMIN — LOSARTAN POTASSIUM 50 MG: 50 TABLET, FILM COATED ORAL at 08:13

## 2019-06-16 RX ADMIN — PANTOPRAZOLE SODIUM 20 MG: 20 TABLET, DELAYED RELEASE ORAL at 05:17

## 2019-06-16 RX ADMIN — LORAZEPAM 0.5 MG: 1 TABLET ORAL at 22:05

## 2019-06-16 RX ADMIN — FLUTICASONE PROPIONATE 1 PUFF: 110 AEROSOL, METERED RESPIRATORY (INHALATION) at 08:14

## 2019-06-16 RX ADMIN — FERROUS SULFATE TAB 325 MG (65 MG ELEMENTAL FE) 325 MG: 325 (65 FE) TAB at 08:13

## 2019-06-16 RX ADMIN — ASPIRIN 81 MG: 81 TABLET, COATED ORAL at 08:27

## 2019-06-16 RX ADMIN — ALBUTEROL SULFATE 2 PUFF: 90 AEROSOL, METERED RESPIRATORY (INHALATION) at 05:25

## 2019-06-16 RX ADMIN — ALBUTEROL SULFATE 2 PUFF: 90 AEROSOL, METERED RESPIRATORY (INHALATION) at 19:27

## 2019-06-16 RX ADMIN — HEPARIN SODIUM 5000 UNITS: 5000 INJECTION INTRAVENOUS; SUBCUTANEOUS at 16:02

## 2019-06-16 RX ADMIN — FOLIC ACID 1 MG: 1 TABLET ORAL at 08:13

## 2019-06-16 RX ADMIN — INSULIN GLARGINE 20 UNITS: 100 INJECTION, SOLUTION SUBCUTANEOUS at 08:13

## 2019-06-16 RX ADMIN — ATORVASTATIN CALCIUM 40 MG: 40 TABLET, FILM COATED ORAL at 08:13

## 2019-06-17 LAB
ANION GAP SERPL CALCULATED.3IONS-SCNC: 9 MMOL/L (ref 4–13)
BASOPHILS # BLD AUTO: 0.01 THOUSANDS/ΜL (ref 0–0.1)
BASOPHILS NFR BLD AUTO: 0 % (ref 0–1)
BUN SERPL-MCNC: 10 MG/DL (ref 5–25)
C DIFF TOX GENS STL QL NAA+PROBE: NORMAL
CALCIUM SERPL-MCNC: 9 MG/DL (ref 8.3–10.1)
CHLORIDE SERPL-SCNC: 109 MMOL/L (ref 100–108)
CO2 SERPL-SCNC: 28 MMOL/L (ref 21–32)
CREAT SERPL-MCNC: 0.72 MG/DL (ref 0.6–1.3)
EOSINOPHIL # BLD AUTO: 0.23 THOUSAND/ΜL (ref 0–0.61)
EOSINOPHIL NFR BLD AUTO: 5 % (ref 0–6)
ERYTHROCYTE [DISTWIDTH] IN BLOOD BY AUTOMATED COUNT: 19 % (ref 11.6–15.1)
GFR SERPL CREATININE-BSD FRML MDRD: 80 ML/MIN/1.73SQ M
GLUCOSE SERPL-MCNC: 118 MG/DL (ref 65–140)
GLUCOSE SERPL-MCNC: 119 MG/DL (ref 65–140)
GLUCOSE SERPL-MCNC: 174 MG/DL (ref 65–140)
GLUCOSE SERPL-MCNC: 206 MG/DL (ref 65–140)
GLUCOSE SERPL-MCNC: 260 MG/DL (ref 65–140)
HCT VFR BLD AUTO: 29.9 % (ref 34.8–46.1)
HGB BLD-MCNC: 9.7 G/DL (ref 11.5–15.4)
IMM GRANULOCYTES # BLD AUTO: 0.02 THOUSAND/UL (ref 0–0.2)
IMM GRANULOCYTES NFR BLD AUTO: 0 % (ref 0–2)
LYMPHOCYTES # BLD AUTO: 0.97 THOUSANDS/ΜL (ref 0.6–4.47)
LYMPHOCYTES NFR BLD AUTO: 21 % (ref 14–44)
MCH RBC QN AUTO: 31.6 PG (ref 26.8–34.3)
MCHC RBC AUTO-ENTMCNC: 32.4 G/DL (ref 31.4–37.4)
MCV RBC AUTO: 97 FL (ref 82–98)
MISCELLANEOUS LAB TEST RESULT: NORMAL
MONOCYTES # BLD AUTO: 0.52 THOUSAND/ΜL (ref 0.17–1.22)
MONOCYTES NFR BLD AUTO: 11 % (ref 4–12)
NEUTROPHILS # BLD AUTO: 2.92 THOUSANDS/ΜL (ref 1.85–7.62)
NEUTS SEG NFR BLD AUTO: 63 % (ref 43–75)
NRBC BLD AUTO-RTO: 0 /100 WBCS
PLATELET # BLD AUTO: 147 THOUSANDS/UL (ref 149–390)
PMV BLD AUTO: 11.6 FL (ref 8.9–12.7)
POTASSIUM SERPL-SCNC: 3.5 MMOL/L (ref 3.5–5.3)
RBC # BLD AUTO: 3.07 MILLION/UL (ref 3.81–5.12)
SODIUM SERPL-SCNC: 146 MMOL/L (ref 136–145)
WBC # BLD AUTO: 4.67 THOUSAND/UL (ref 4.31–10.16)

## 2019-06-17 PROCEDURE — 80048 BASIC METABOLIC PNL TOTAL CA: CPT | Performed by: INTERNAL MEDICINE

## 2019-06-17 PROCEDURE — 85025 COMPLETE CBC W/AUTO DIFF WBC: CPT | Performed by: INTERNAL MEDICINE

## 2019-06-17 PROCEDURE — 82948 REAGENT STRIP/BLOOD GLUCOSE: CPT

## 2019-06-17 PROCEDURE — 99232 SBSQ HOSP IP/OBS MODERATE 35: CPT | Performed by: INTERNAL MEDICINE

## 2019-06-17 RX ORDER — DIPHENHYDRAMINE HCL 25 MG
25 TABLET ORAL EVERY 6 HOURS PRN
Status: DISCONTINUED | OUTPATIENT
Start: 2019-06-17 | End: 2019-06-18 | Stop reason: HOSPADM

## 2019-06-17 RX ADMIN — PANTOPRAZOLE SODIUM 20 MG: 20 TABLET, DELAYED RELEASE ORAL at 05:57

## 2019-06-17 RX ADMIN — HEPARIN SODIUM 5000 UNITS: 5000 INJECTION INTRAVENOUS; SUBCUTANEOUS at 21:11

## 2019-06-17 RX ADMIN — HEPARIN SODIUM 5000 UNITS: 5000 INJECTION INTRAVENOUS; SUBCUTANEOUS at 05:57

## 2019-06-17 RX ADMIN — FERROUS SULFATE TAB 325 MG (65 MG ELEMENTAL FE) 325 MG: 325 (65 FE) TAB at 08:57

## 2019-06-17 RX ADMIN — INSULIN LISPRO 1 UNITS: 100 INJECTION, SOLUTION INTRAVENOUS; SUBCUTANEOUS at 16:30

## 2019-06-17 RX ADMIN — GABAPENTIN 100 MG: 100 CAPSULE ORAL at 21:12

## 2019-06-17 RX ADMIN — FOLIC ACID 1 MG: 1 TABLET ORAL at 08:57

## 2019-06-17 RX ADMIN — ATORVASTATIN CALCIUM 40 MG: 40 TABLET, FILM COATED ORAL at 08:57

## 2019-06-17 RX ADMIN — INSULIN GLARGINE 10 UNITS: 100 INJECTION, SOLUTION SUBCUTANEOUS at 21:11

## 2019-06-17 RX ADMIN — INSULIN GLARGINE 20 UNITS: 100 INJECTION, SOLUTION SUBCUTANEOUS at 11:31

## 2019-06-17 RX ADMIN — DIPHENHYDRAMINE HCL 25 MG: 25 TABLET, FILM COATED ORAL at 12:42

## 2019-06-17 RX ADMIN — ASPIRIN 81 MG: 81 TABLET, COATED ORAL at 08:57

## 2019-06-17 RX ADMIN — FLUTICASONE PROPIONATE 1 PUFF: 110 AEROSOL, METERED RESPIRATORY (INHALATION) at 21:12

## 2019-06-17 RX ADMIN — LOSARTAN POTASSIUM 50 MG: 50 TABLET, FILM COATED ORAL at 08:57

## 2019-06-17 RX ADMIN — DIPHENHYDRAMINE HCL 25 MG: 25 TABLET, FILM COATED ORAL at 21:11

## 2019-06-17 RX ADMIN — INSULIN LISPRO 2 UNITS: 100 INJECTION, SOLUTION INTRAVENOUS; SUBCUTANEOUS at 13:40

## 2019-06-17 RX ADMIN — FLUTICASONE PROPIONATE 1 PUFF: 110 AEROSOL, METERED RESPIRATORY (INHALATION) at 08:56

## 2019-06-17 RX ADMIN — ALBUTEROL SULFATE 2 PUFF: 90 AEROSOL, METERED RESPIRATORY (INHALATION) at 19:14

## 2019-06-18 VITALS
WEIGHT: 165.8 LBS | DIASTOLIC BLOOD PRESSURE: 60 MMHG | SYSTOLIC BLOOD PRESSURE: 135 MMHG | OXYGEN SATURATION: 94 % | BODY MASS INDEX: 30.51 KG/M2 | HEIGHT: 62 IN | RESPIRATION RATE: 18 BRPM | HEART RATE: 87 BPM | TEMPERATURE: 97.6 F

## 2019-06-18 PROBLEM — R19.7 BLOODY DIARRHEA: Status: RESOLVED | Noted: 2019-06-15 | Resolved: 2019-06-18

## 2019-06-18 LAB
ANION GAP SERPL CALCULATED.3IONS-SCNC: 9 MMOL/L (ref 4–13)
BASOPHILS # BLD AUTO: 0.01 THOUSANDS/ΜL (ref 0–0.1)
BASOPHILS NFR BLD AUTO: 0 % (ref 0–1)
BUN SERPL-MCNC: 12 MG/DL (ref 5–25)
CALCIUM SERPL-MCNC: 8.8 MG/DL (ref 8.3–10.1)
CHLORIDE SERPL-SCNC: 109 MMOL/L (ref 100–108)
CO2 SERPL-SCNC: 27 MMOL/L (ref 21–32)
CREAT SERPL-MCNC: 0.71 MG/DL (ref 0.6–1.3)
EOSINOPHIL # BLD AUTO: 0.21 THOUSAND/ΜL (ref 0–0.61)
EOSINOPHIL NFR BLD AUTO: 5 % (ref 0–6)
ERYTHROCYTE [DISTWIDTH] IN BLOOD BY AUTOMATED COUNT: 18 % (ref 11.6–15.1)
GFR SERPL CREATININE-BSD FRML MDRD: 82 ML/MIN/1.73SQ M
GLUCOSE SERPL-MCNC: 109 MG/DL (ref 65–140)
GLUCOSE SERPL-MCNC: 112 MG/DL (ref 65–140)
GLUCOSE SERPL-MCNC: 122 MG/DL (ref 65–140)
HCT VFR BLD AUTO: 28.7 % (ref 34.8–46.1)
HGB BLD-MCNC: 9.2 G/DL (ref 11.5–15.4)
IMM GRANULOCYTES # BLD AUTO: 0.01 THOUSAND/UL (ref 0–0.2)
IMM GRANULOCYTES NFR BLD AUTO: 0 % (ref 0–2)
LYMPHOCYTES # BLD AUTO: 0.83 THOUSANDS/ΜL (ref 0.6–4.47)
LYMPHOCYTES NFR BLD AUTO: 21 % (ref 14–44)
MCH RBC QN AUTO: 31.4 PG (ref 26.8–34.3)
MCHC RBC AUTO-ENTMCNC: 32.1 G/DL (ref 31.4–37.4)
MCV RBC AUTO: 98 FL (ref 82–98)
MONOCYTES # BLD AUTO: 0.49 THOUSAND/ΜL (ref 0.17–1.22)
MONOCYTES NFR BLD AUTO: 12 % (ref 4–12)
NEUTROPHILS # BLD AUTO: 2.44 THOUSANDS/ΜL (ref 1.85–7.62)
NEUTS SEG NFR BLD AUTO: 62 % (ref 43–75)
NRBC BLD AUTO-RTO: 0 /100 WBCS
PLATELET # BLD AUTO: 126 THOUSANDS/UL (ref 149–390)
PMV BLD AUTO: 10.5 FL (ref 8.9–12.7)
POTASSIUM SERPL-SCNC: 3.6 MMOL/L (ref 3.5–5.3)
RBC # BLD AUTO: 2.93 MILLION/UL (ref 3.81–5.12)
SODIUM SERPL-SCNC: 145 MMOL/L (ref 136–145)
WBC # BLD AUTO: 3.99 THOUSAND/UL (ref 4.31–10.16)

## 2019-06-18 PROCEDURE — 99239 HOSP IP/OBS DSCHRG MGMT >30: CPT | Performed by: INTERNAL MEDICINE

## 2019-06-18 PROCEDURE — 80048 BASIC METABOLIC PNL TOTAL CA: CPT | Performed by: INTERNAL MEDICINE

## 2019-06-18 PROCEDURE — 82948 REAGENT STRIP/BLOOD GLUCOSE: CPT

## 2019-06-18 PROCEDURE — 85025 COMPLETE CBC W/AUTO DIFF WBC: CPT | Performed by: INTERNAL MEDICINE

## 2019-06-18 RX ADMIN — PANTOPRAZOLE SODIUM 20 MG: 20 TABLET, DELAYED RELEASE ORAL at 05:22

## 2019-06-18 RX ADMIN — DIPHENHYDRAMINE HCL 25 MG: 25 TABLET, FILM COATED ORAL at 05:26

## 2019-06-18 RX ADMIN — FLUTICASONE PROPIONATE 1 PUFF: 110 AEROSOL, METERED RESPIRATORY (INHALATION) at 08:15

## 2019-06-18 RX ADMIN — LOSARTAN POTASSIUM 50 MG: 50 TABLET, FILM COATED ORAL at 08:15

## 2019-06-18 RX ADMIN — FOLIC ACID 1 MG: 1 TABLET ORAL at 08:15

## 2019-06-18 RX ADMIN — ASPIRIN 81 MG: 81 TABLET, COATED ORAL at 08:15

## 2019-06-18 RX ADMIN — HEPARIN SODIUM 5000 UNITS: 5000 INJECTION INTRAVENOUS; SUBCUTANEOUS at 05:22

## 2019-06-18 RX ADMIN — FERROUS SULFATE TAB 325 MG (65 MG ELEMENTAL FE) 325 MG: 325 (65 FE) TAB at 08:15

## 2019-06-18 RX ADMIN — ATORVASTATIN CALCIUM 40 MG: 40 TABLET, FILM COATED ORAL at 08:15

## 2019-07-23 ENCOUNTER — OFFICE VISIT (OUTPATIENT)
Dept: GYNECOLOGIC ONCOLOGY | Facility: CLINIC | Age: 79
End: 2019-07-23
Payer: MEDICARE

## 2019-07-23 VITALS
SYSTOLIC BLOOD PRESSURE: 138 MMHG | DIASTOLIC BLOOD PRESSURE: 76 MMHG | BODY MASS INDEX: 30.73 KG/M2 | RESPIRATION RATE: 16 BRPM | HEIGHT: 62 IN | WEIGHT: 167 LBS | HEART RATE: 88 BPM

## 2019-07-23 DIAGNOSIS — K62.5 RECTAL BLEEDING: ICD-10-CM

## 2019-07-23 DIAGNOSIS — R10.2 PELVIC PAIN: ICD-10-CM

## 2019-07-23 DIAGNOSIS — Z85.42 H/O CANCER OF UTERUS: Primary | ICD-10-CM

## 2019-07-23 PROCEDURE — 99213 OFFICE O/P EST LOW 20 MIN: CPT | Performed by: OBSTETRICS & GYNECOLOGY

## 2019-07-23 NOTE — ASSESSMENT & PLAN NOTE
History of radiation proctitis  Recent admission to [facility] for rectal bleeding  Patient evaluated by colorectal specialist   No evidence of neoplastic lesions  Anemic  Follows up with primary care for consideration of iron/blood transfusions

## 2019-07-23 NOTE — PROGRESS NOTES
Assessment/Plan:    Problem List Items Addressed This Visit        Digestive    Rectal bleeding     History of radiation proctitis  Recent admission to [facility] for rectal bleeding  Patient evaluated by colorectal specialist   No evidence of neoplastic lesions  Anemic  Follows up with primary care for consideration of iron/blood transfusions  Other    H/O cancer of uterus - Primary     Clinically she has no evidence of disease  Approaching 5 years from completion of therapy  I plan to see her back in 1 year  Pelvic pain     Given history of pelvic/endometrial cancer will obtain CT scan of the abdomen and pelvis to rule out the remote possibility of pain being associated to possibility of cancer recurrence  I will review results  Addendum:  CT SCAN WAS INDEED PERFORMED JUNE 2019 AT [facility]  RESULTS REVIEWED  NO EVIDENCE OF DISEASE  FURTHER IMAGING NOT INDICATED AT THIS TIME  CHIEF COMPLAINT:     Surveillance for history of uterine cancer  Previous therapy:     H/O cancer of uterus    1/13/2014 Surgery     Robotic-assisted extensive lysis of adhesions, total hysterectomy, bilateral salpingo-oophorectomy, omentectomy, pelvic and periaortic lymphadenectomy  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 and 1 out of 15 positive pelvic lymph nodes  Positive washings  - 7/11/2014 Chemotherapy     Adjuvant chemotherapy + RT: s/p RT after 3 cycles of carbo/taxol (sandwiched plan) plus x3 cycles of post-RT chemo (sandwiched)  Completed July 11th 2014  Flexible sigmoidoscopy November 2017 (Dr Lucretia Ha): no evidence of polyps or malignancy  Radiation changes noted  Patient ID: Sapphire Dobson is a 67 y o  female  HPI  Patient with history of stage IIIC2 uterine carcinosarcoma, completed adjuvant chemo radiotherapy July 2014   Has remained with no evidence of disease  Treatment was complicated by radiation proctitis  Recently, admitted [facility] for rectal bleeding  Workup noted  Evaluated by colorectal specialist   No neoplastic abnormalities identified  CT scan was ordered prior apparently not performed  Patient complains today of significant midpelvic pain, intermittent, associated with occasional episodes of rectal bleeding  The following portions of the patient's history were reviewed and updated as appropriate: allergies, current medications, past family history, past medical history, past social history, past surgical history and problem list     Review of Systems  Diffuse bone and joint pain  Discontinued methotrexate given anemia  As above  Otherwise 12 point review of systems is noncontributory  Current Outpatient Medications   Medication Sig Dispense Refill    acetaminophen (TYLENOL) 500 mg tablet Take 1,000 mg by mouth every 8 (eight) hours as needed      albuterol (PROVENTIL HFA,VENTOLIN HFA) 90 mcg/act inhaler Inhale 2 puffs every 6 (six) hours as needed for wheezing   aspirin 81 MG tablet Take 81 mg by mouth      atorvastatin (LIPITOR) 10 mg tablet Take 40 mg by mouth daily       clobetasol (TEMOVATE) 0 05 % ointment Apply topically 2 (two) times a day For 1 week, then 3 days a week for 1 week, then once a week as needed (Patient not taking: Reported on 4/2/2019) 30 g 1    diphenhydrAMINE-APAP 12 5-325 MG/15ML LIQD Take 25 mg by mouth daily      ferrous sulfate 325 (65 Fe) mg tablet Take 325 mg by mouth daily with breakfast      FLOVENT DISKUS 100 MCG/BLIST AEPB INHALE 1 PUFF 2 TIMES A DAY UNTIL COUGH RESOLVED  0    folic acid (FOLVITE) 1 mg tablet Take 1 mg by mouth daily        gabapentin (NEURONTIN) 100 mg capsule Take 100 mg by mouth daily at bedtime      insulin aspart (NovoLOG) 100 units/mL injection Inject 4-6 Units under the skin 3 (three) times a day before meals      insulin glargine (LANTUS) 100 units/mL subcutaneous injection Inject 16 Units under the skin daily at bedtime       insulin glargine (LANTUS) 100 units/mL subcutaneous injection Inject 40 Units under the skin daily       lidocaine-prilocaine (EMLA) cream Apply topically      LORazepam (ATIVAN) 1 mg tablet Take 0 5 mg by mouth 2 (two) times a day as needed for anxiety       LOSARTAN POTASSIUM PO Take 50 mg by mouth daily   metFORMIN (GLUCOPHAGE-XR) 500 mg 24 hr tablet Take 500 mg by mouth daily with breakfast      methotrexate 2 5 mg tablet Take 20 mg by mouth once a week Pt takes 8 tabs on Wednesdays      naproxen sodium (ALEVE) 220 MG tablet Take 220 mg by mouth      omeprazole (PriLOSEC) 20 mg delayed release capsule Take 20 mg by mouth daily as needed   pantoprazole (PROTONIX) 20 mg tablet Take 20 mg by mouth daily       No current facility-administered medications for this visit  Objective:    Blood pressure 138/76, pulse 88, resp  rate 16, height 5' 2" (1 575 m), weight 75 8 kg (167 lb)  Body mass index is 30 54 kg/m²  Body surface area is 1 77 meters squared  Physical Exam   Constitutional: She is oriented to person, place, and time  She appears well-developed and well-nourished  HENT:   Head: Normocephalic and atraumatic  Neck: Normal range of motion  Neck supple  No thyromegaly present  Cardiovascular: Normal rate and regular rhythm  No murmur heard  Pulmonary/Chest: Effort normal    Abdominal: Soft  She exhibits no distension and no mass  There is tenderness (Midpelvic)  There is no rebound  Genitourinary:   Genitourinary Comments: The external female genitalia is normal  The bartholin's, uretheral and skenes glands are normal  The urethral meatus is normal (midline with no lesions)  Anus without fissure or lesion  Speculum exam reveals foreshortened and atrophic but otherwise normal vagina  No masses, lesions, discharge or bleeding  No significant cystocele or rectocele noted   Bimanual exam notes a surgical absent cervix, uterus and adnexal structures  No masses or fullness  Bladder is without fullness, mass or tenderness  Musculoskeletal: Normal range of motion  She exhibits no edema  Lymphadenopathy:     She has no cervical adenopathy  Neurological: She is alert and oriented to person, place, and time  Skin: Skin is warm and dry  No rash noted  Psychiatric: She has a normal mood and affect  Her behavior is normal    Vitals reviewed        Lab Results   Component Value Date     19 7 01/09/2019     Lab Results   Component Value Date     10/28/2015    K 3 6 06/18/2019     (H) 06/18/2019    CO2 27 06/18/2019    ANIONGAP 9 10/28/2015    BUN 12 06/18/2019    CREATININE 0 71 06/18/2019    GLUCOSE 226 (H) 10/28/2015    GLUF 239 (H) 01/09/2019    CALCIUM 8 8 06/18/2019    AST 28 06/16/2019    ALT 17 06/16/2019    ALKPHOS 117 (H) 06/16/2019    PROT 6 3 (L) 10/28/2015    BILITOT 0 5 10/28/2015    EGFR 82 06/18/2019     Lab Results   Component Value Date    WBC 3 99 (L) 06/18/2019    HGB 9 2 (L) 06/18/2019    HCT 28 7 (L) 06/18/2019    MCV 98 06/18/2019     (L) 06/18/2019     Lab Results   Component Value Date    NEUTROABS 2 44 06/18/2019       Ruth Mccain MD, FACOG, FACS  7/23/2019  10:20 AM

## 2019-07-23 NOTE — ASSESSMENT & PLAN NOTE
Clinically she has no evidence of disease  Approaching 5 years from completion of therapy  I plan to see her back in 1 year

## 2019-07-23 NOTE — ASSESSMENT & PLAN NOTE
Given history of pelvic/endometrial cancer will obtain CT scan of the abdomen and pelvis to rule out the remote possibility of pain being associated to possibility of cancer recurrence  I will review results  Addendum:  CT SCAN WAS INDEED PERFORMED JUNE 2019 AT [facility]  RESULTS REVIEWED  NO EVIDENCE OF DISEASE  FURTHER IMAGING NOT INDICATED AT THIS TIME

## 2019-07-23 NOTE — LETTER
July 23, 2019     Charmayne Shutter, [address]  [employer]  [facility]    Patient: Angelique Maxwell   YOB: 1940   Date of Visit: 7/23/2019       Dear Dr Herminio Johansen: Thank you for referring Abel Friedman to me for evaluation  Below are my notes for this consultation  If you have questions, please do not hesitate to call me  I look forward to following your patient along with you  Sincerely,        Tasha Marie MD        CC: MD Tasha Valladares MD  7/23/2019 10:15 AM  Sign at close encounter  Assessment/Plan:    Problem List Items Addressed This Visit        Digestive    Rectal bleeding     History of radiation proctitis  Recent admission to [facility] for rectal bleeding  Patient evaluated by colorectal specialist   No evidence of neoplastic lesions  Anemic  Follows up with primary care for consideration of iron/blood transfusions  Other    H/O cancer of uterus - Primary     Clinically she has no evidence of disease  Approaching 5 years from completion of therapy  Will obtain CT scan given pelvic pain  In the absence of abnormalities I plan to see her back in 1 year  Relevant Orders    CT abdomen pelvis w contrast    Pelvic pain     Given history of pelvic/endometrial cancer will obtain CT scan of the abdomen and pelvis to rule out the remote possibility of pain being associated to possibility of cancer recurrence  I will review results  Relevant Orders    CT abdomen pelvis w contrast            CHIEF COMPLAINT:     Surveillance for history of uterine cancer  Previous therapy:     H/O cancer of uterus    1/13/2014 Surgery     Robotic-assisted extensive lysis of adhesions, total hysterectomy, bilateral salpingo-oophorectomy, omentectomy, pelvic and periaortic lymphadenectomy   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 and 1 out of 15 positive pelvic lymph nodes  Positive washings  - 7/11/2014 Chemotherapy     Adjuvant chemotherapy + RT: s/p RT after 3 cycles of carbo/taxol (sandwiched plan) plus x3 cycles of post-RT chemo (sandwiched)  Completed July 11th 2014  Flexible sigmoidoscopy November 2017 (Dr Carolann Bence): no evidence of polyps or malignancy  Radiation changes noted  Patient ID: Josey Tolentino is a 67 y o  female  HPI  Patient with history of stage IIIC2 uterine carcinosarcoma, completed adjuvant chemo radiotherapy July 2014  Has remained with no evidence of disease  Treatment was complicated by radiation proctitis  Recently, admitted [facility] for rectal bleeding  Workup noted  Evaluated by colorectal specialist   No neoplastic abnormalities identified  CT scan was ordered prior apparently not performed  Patient complains today of significant midpelvic pain, intermittent, associated with occasional episodes of rectal bleeding  The following portions of the patient's history were reviewed and updated as appropriate: allergies, current medications, past family history, past medical history, past social history, past surgical history and problem list     Review of Systems  Diffuse bone and joint pain  Discontinued methotrexate given anemia  As above  Otherwise 12 point review of systems is noncontributory  Current Outpatient Medications   Medication Sig Dispense Refill    acetaminophen (TYLENOL) 500 mg tablet Take 1,000 mg by mouth every 8 (eight) hours as needed      albuterol (PROVENTIL HFA,VENTOLIN HFA) 90 mcg/act inhaler Inhale 2 puffs every 6 (six) hours as needed for wheezing        aspirin 81 MG tablet Take 81 mg by mouth      atorvastatin (LIPITOR) 10 mg tablet Take 40 mg by mouth daily       clobetasol (TEMOVATE) 0 05 % ointment Apply topically 2 (two) times a day For 1 week, then 3 days a week for 1 week, then once a week as needed (Patient not taking: Reported on 4/2/2019) 30 g 1    diphenhydrAMINE-APAP 12 5-325 MG/15ML LIQD Take 25 mg by mouth daily      ferrous sulfate 325 (65 Fe) mg tablet Take 325 mg by mouth daily with breakfast      FLOVENT DISKUS 100 MCG/BLIST AEPB INHALE 1 PUFF 2 TIMES A DAY UNTIL COUGH RESOLVED  0    folic acid (FOLVITE) 1 mg tablet Take 1 mg by mouth daily   gabapentin (NEURONTIN) 100 mg capsule Take 100 mg by mouth daily at bedtime      insulin aspart (NovoLOG) 100 units/mL injection Inject 4-6 Units under the skin 3 (three) times a day before meals      insulin glargine (LANTUS) 100 units/mL subcutaneous injection Inject 16 Units under the skin daily at bedtime       insulin glargine (LANTUS) 100 units/mL subcutaneous injection Inject 40 Units under the skin daily       lidocaine-prilocaine (EMLA) cream Apply topically      LORazepam (ATIVAN) 1 mg tablet Take 0 5 mg by mouth 2 (two) times a day as needed for anxiety       LOSARTAN POTASSIUM PO Take 50 mg by mouth daily   metFORMIN (GLUCOPHAGE-XR) 500 mg 24 hr tablet Take 500 mg by mouth daily with breakfast      methotrexate 2 5 mg tablet Take 20 mg by mouth once a week Pt takes 8 tabs on Wednesdays      naproxen sodium (ALEVE) 220 MG tablet Take 220 mg by mouth      omeprazole (PriLOSEC) 20 mg delayed release capsule Take 20 mg by mouth daily as needed   pantoprazole (PROTONIX) 20 mg tablet Take 20 mg by mouth daily       No current facility-administered medications for this visit  Objective:    Blood pressure 138/76, pulse 88, resp  rate 16, height 5' 2" (1 575 m), weight 75 8 kg (167 lb)  Body mass index is 30 54 kg/m²  Body surface area is 1 77 meters squared  Physical Exam   Constitutional: She is oriented to person, place, and time  She appears well-developed and well-nourished  HENT:   Head: Normocephalic and atraumatic  Neck: Normal range of motion  Neck supple  No thyromegaly present     Cardiovascular: Normal rate and regular rhythm  No murmur heard  Pulmonary/Chest: Effort normal    Abdominal: Soft  She exhibits no distension and no mass  There is tenderness (Midpelvic)  There is no rebound  Genitourinary:   Genitourinary Comments: The external female genitalia is normal  The bartholin's, uretheral and skenes glands are normal  The urethral meatus is normal (midline with no lesions)  Anus without fissure or lesion  Speculum exam reveals foreshortened and atrophic but otherwise normal vagina  No masses, lesions, discharge or bleeding  No significant cystocele or rectocele noted  Bimanual exam notes a surgical absent cervix, uterus and adnexal structures  No masses or fullness  Bladder is without fullness, mass or tenderness  Musculoskeletal: Normal range of motion  She exhibits no edema  Lymphadenopathy:     She has no cervical adenopathy  Neurological: She is alert and oriented to person, place, and time  Skin: Skin is warm and dry  No rash noted  Psychiatric: She has a normal mood and affect  Her behavior is normal    Vitals reviewed        Lab Results   Component Value Date     19 7 01/09/2019     Lab Results   Component Value Date     10/28/2015    K 3 6 06/18/2019     (H) 06/18/2019    CO2 27 06/18/2019    ANIONGAP 9 10/28/2015    BUN 12 06/18/2019    CREATININE 0 71 06/18/2019    GLUCOSE 226 (H) 10/28/2015    GLUF 239 (H) 01/09/2019    CALCIUM 8 8 06/18/2019    AST 28 06/16/2019    ALT 17 06/16/2019    ALKPHOS 117 (H) 06/16/2019    PROT 6 3 (L) 10/28/2015    BILITOT 0 5 10/28/2015    EGFR 82 06/18/2019     Lab Results   Component Value Date    WBC 3 99 (L) 06/18/2019    HGB 9 2 (L) 06/18/2019    HCT 28 7 (L) 06/18/2019    MCV 98 06/18/2019     (L) 06/18/2019     Lab Results   Component Value Date    NEUTROABS 2 44 06/18/2019       Chris Bryan MD, FACOG, FACS  7/23/2019  10:15 AM

## 2019-08-07 ENCOUNTER — APPOINTMENT (OUTPATIENT)
Dept: LAB | Facility: CLINIC | Age: 79
End: 2019-08-07
Payer: MEDICARE

## 2019-08-07 ENCOUNTER — TRANSCRIBE ORDERS (OUTPATIENT)
Dept: LAB | Facility: CLINIC | Age: 79
End: 2019-08-07

## 2019-08-07 ENCOUNTER — HOSPITAL ENCOUNTER (OUTPATIENT)
Dept: INFUSION CENTER | Facility: CLINIC | Age: 79
Discharge: HOME | End: 2019-08-07
Payer: MEDICARE

## 2019-08-07 DIAGNOSIS — Z51.81 ENCOUNTER FOR THERAPEUTIC DRUG MONITORING: ICD-10-CM

## 2019-08-07 DIAGNOSIS — D64.9 ANEMIA, UNSPECIFIED TYPE: ICD-10-CM

## 2019-08-07 DIAGNOSIS — Z51.81 ENCOUNTER FOR THERAPEUTIC DRUG MONITORING: Primary | ICD-10-CM

## 2019-08-07 DIAGNOSIS — D64.9 ANEMIA, UNSPECIFIED TYPE: Primary | ICD-10-CM

## 2019-08-07 LAB
ALBUMIN SERPL BCP-MCNC: 2.9 G/DL (ref 3.5–5.7)
ALP SERPL-CCNC: 102 U/L (ref 46–116)
ALT SERPL W P-5'-P-CCNC: 16 U/L (ref 12–78)
ANION GAP SERPL CALCULATED.3IONS-SCNC: 8 MMOL/L (ref 4–13)
AST SERPL W P-5'-P-CCNC: 24 U/L (ref 5–45)
BASOPHILS # BLD AUTO: 0.01 THOUSANDS/ΜL (ref 0–0.1)
BASOPHILS NFR BLD AUTO: 0 % (ref 0–1)
BILIRUB SERPL-MCNC: 0.4 MG/DL (ref 0.2–1)
BUN SERPL-MCNC: 23 MG/DL (ref 5–25)
CALCIUM SERPL-MCNC: 8.9 MG/DL (ref 8.3–10.1)
CHLORIDE SERPL-SCNC: 104 MMOL/L (ref 98–108)
CO2 SERPL-SCNC: 24 MMOL/L (ref 21–32)
CREAT SERPL-MCNC: 0.9 MG/DL (ref 0.6–1.3)
EOSINOPHIL # BLD AUTO: 0.03 THOUSAND/ΜL (ref 0–0.61)
EOSINOPHIL NFR BLD AUTO: 1 % (ref 0–6)
ERYTHROCYTE [DISTWIDTH] IN BLOOD BY AUTOMATED COUNT: 16 % (ref 11.6–15.1)
ERYTHROCYTE [SEDIMENTATION RATE] IN BLOOD: 50 MM/HOUR (ref 0–20)
EST. AVERAGE GLUCOSE BLD GHB EST-MCNC: 151 MG/DL
FERRITIN SERPL-MCNC: 14 NG/ML (ref 8–388)
GFR SERPL CREATININE-BSD FRML MDRD: 61 ML/MIN/1.73SQ M
GLUCOSE SERPL-MCNC: 445 MG/DL (ref 65–140)
HBA1C MFR BLD: 6.9 % (ref 4.2–6.3)
HCT VFR BLD AUTO: 18.5 % (ref 34.8–46.1)
HGB BLD-MCNC: 5.9 G/DL (ref 11.5–15.4)
LYMPHOCYTES # BLD AUTO: 1.43 THOUSANDS/ΜL (ref 0.6–4.47)
LYMPHOCYTES NFR BLD AUTO: 22 % (ref 14–44)
MCH RBC QN AUTO: 31.2 PG (ref 26.8–34.3)
MCHC RBC AUTO-ENTMCNC: 31.9 G/DL (ref 31.4–37.4)
MCV RBC AUTO: 98 FL (ref 82–98)
MONOCYTES # BLD AUTO: 0.74 THOUSAND/ΜL (ref 0.17–1.22)
MONOCYTES NFR BLD AUTO: 11 % (ref 4–12)
NEUTROPHILS # BLD AUTO: 4.27 THOUSANDS/ΜL (ref 1.85–7.62)
NEUTS SEG NFR BLD AUTO: 66 % (ref 43–75)
PLATELET # BLD AUTO: 195 THOUSANDS/UL (ref 149–390)
PMV BLD AUTO: 10.6 FL (ref 8.9–12.7)
POTASSIUM SERPL-SCNC: 4 MMOL/L (ref 3.5–5.3)
PROT SERPL-MCNC: 6.5 G/DL (ref 6.4–8.2)
RBC # BLD AUTO: 1.89 MILLION/UL (ref 3.81–5.12)
SODIUM SERPL-SCNC: 136 MMOL/L (ref 136–145)
TIBC SERPL-MCNC: 364 UG/DL (ref 250–450)
WBC # BLD AUTO: 6.48 THOUSAND/UL (ref 4.31–10.16)

## 2019-08-07 PROCEDURE — 80053 COMPREHEN METABOLIC PANEL: CPT

## 2019-08-07 PROCEDURE — 83036 HEMOGLOBIN GLYCOSYLATED A1C: CPT

## 2019-08-07 PROCEDURE — 83550 IRON BINDING TEST: CPT

## 2019-08-07 PROCEDURE — 36415 COLL VENOUS BLD VENIPUNCTURE: CPT

## 2019-08-07 PROCEDURE — 85025 COMPLETE CBC W/AUTO DIFF WBC: CPT

## 2019-08-07 PROCEDURE — 82728 ASSAY OF FERRITIN: CPT

## 2019-08-07 PROCEDURE — 85652 RBC SED RATE AUTOMATED: CPT

## 2019-08-07 NOTE — PROGRESS NOTES
Patient arrived on unit for central labs  Tolerated lab draw and port flush  Aware of next appointment   AVS given to patient

## 2019-08-07 NOTE — PROGRESS NOTES
Patient's Hgb- 5 9; glucose-445  A called placed to Dr Thomas Boyer office in [place] and spoke with Sterling Lai, nurse in office and made aware  CBC/CMP faxed to office  Made Sterling Lai in office aware that patient has left infusion center already as she was here for central labs only today  Denies stated that office would follow up with physician and patient on these lab values

## 2019-08-07 NOTE — PROGRESS NOTES
A call placed to patient's home  Message left that Dr Keo Gar office will be in contact with her regarding lab values   Explained to call physician office if she does not receive a call from them

## 2019-08-12 DIAGNOSIS — C55 CARCINOSARCOMA OF UTERUS (HCC): Primary | ICD-10-CM

## 2019-08-12 RX ORDER — LIDOCAINE AND PRILOCAINE 25; 25 MG/G; MG/G
CREAM TOPICAL AS NEEDED
Qty: 30 G | Refills: 1 | Status: SHIPPED | OUTPATIENT
Start: 2019-08-12

## 2019-08-14 ENCOUNTER — TRANSCRIBE ORDERS (OUTPATIENT)
Dept: RADIATION ONCOLOGY | Facility: CLINIC | Age: 79
End: 2019-08-14

## 2019-08-14 ENCOUNTER — APPOINTMENT (OUTPATIENT)
Dept: LAB | Facility: CLINIC | Age: 79
End: 2019-08-14
Payer: MEDICARE

## 2019-08-14 ENCOUNTER — HOSPITAL ENCOUNTER (OUTPATIENT)
Dept: INFUSION CENTER | Facility: CLINIC | Age: 79
Discharge: HOME | End: 2019-08-14
Payer: MEDICARE

## 2019-08-14 DIAGNOSIS — D64.9 ANEMIA, UNSPECIFIED TYPE: ICD-10-CM

## 2019-08-14 DIAGNOSIS — D64.9 ANEMIA, UNSPECIFIED TYPE: Primary | ICD-10-CM

## 2019-08-14 DIAGNOSIS — L90.0 LICHEN SCLEROSUS: ICD-10-CM

## 2019-08-14 DIAGNOSIS — Z45.2 ENCOUNTER FOR INSERTION OF VENOUS ACCESS PORT: Primary | ICD-10-CM

## 2019-08-14 LAB
ALBUMIN SERPL BCP-MCNC: 3.1 G/DL (ref 3.5–5.7)
ALP SERPL-CCNC: 100 U/L (ref 46–116)
ALT SERPL W P-5'-P-CCNC: 13 U/L (ref 12–78)
ANION GAP SERPL CALCULATED.3IONS-SCNC: 7 MMOL/L (ref 4–13)
AST SERPL W P-5'-P-CCNC: 27 U/L (ref 5–45)
BASOPHILS # BLD AUTO: 0.01 THOUSANDS/ΜL (ref 0–0.1)
BASOPHILS NFR BLD AUTO: 0 % (ref 0–1)
BILIRUB SERPL-MCNC: 0.6 MG/DL (ref 0.2–1)
BUN SERPL-MCNC: 8 MG/DL (ref 5–25)
CALCIUM SERPL-MCNC: 9 MG/DL (ref 8.3–10.1)
CHLORIDE SERPL-SCNC: 108 MMOL/L (ref 98–108)
CO2 SERPL-SCNC: 26 MMOL/L (ref 21–32)
CREAT SERPL-MCNC: 0.7 MG/DL (ref 0.6–1.3)
EOSINOPHIL # BLD AUTO: 0.17 THOUSAND/ΜL (ref 0–0.61)
EOSINOPHIL NFR BLD AUTO: 3 % (ref 0–6)
ERYTHROCYTE [DISTWIDTH] IN BLOOD BY AUTOMATED COUNT: 15.5 % (ref 11.6–15.1)
GFR SERPL CREATININE-BSD FRML MDRD: 83 ML/MIN/1.73SQ M
GLUCOSE SERPL-MCNC: 206 MG/DL (ref 65–140)
HCT VFR BLD AUTO: 27.8 % (ref 34.8–46.1)
HGB BLD-MCNC: 9.1 G/DL (ref 11.5–15.4)
LYMPHOCYTES # BLD AUTO: 1.2 THOUSANDS/ΜL (ref 0.6–4.47)
LYMPHOCYTES NFR BLD AUTO: 22 % (ref 14–44)
MCH RBC QN AUTO: 30.1 PG (ref 26.8–34.3)
MCHC RBC AUTO-ENTMCNC: 32.7 G/DL (ref 31.4–37.4)
MCV RBC AUTO: 92 FL (ref 82–98)
MONOCYTES # BLD AUTO: 0.6 THOUSAND/ΜL (ref 0.17–1.22)
MONOCYTES NFR BLD AUTO: 11 % (ref 4–12)
NEUTROPHILS # BLD AUTO: 3.61 THOUSANDS/ΜL (ref 1.85–7.62)
NEUTS SEG NFR BLD AUTO: 64 % (ref 43–75)
PLATELET # BLD AUTO: 196 THOUSANDS/UL (ref 149–390)
PMV BLD AUTO: 10.1 FL (ref 8.9–12.7)
POTASSIUM SERPL-SCNC: 3.7 MMOL/L (ref 3.5–5.3)
PROT SERPL-MCNC: 6.7 G/DL (ref 6.4–8.2)
RBC # BLD AUTO: 3.02 MILLION/UL (ref 3.81–5.12)
SODIUM SERPL-SCNC: 141 MMOL/L (ref 136–145)
WBC # BLD AUTO: 5.59 THOUSAND/UL (ref 4.31–10.16)

## 2019-08-14 PROCEDURE — 85025 COMPLETE CBC W/AUTO DIFF WBC: CPT

## 2019-08-14 PROCEDURE — 80053 COMPREHEN METABOLIC PANEL: CPT

## 2019-08-14 NOTE — PROGRESS NOTES
Blood work collected via port a cath  Port flushed per protocol  Patient will call to schedule next port flush appointment   Refused AVS

## 2019-10-02 ENCOUNTER — APPOINTMENT (OUTPATIENT)
Dept: LAB | Facility: CLINIC | Age: 79
End: 2019-10-02
Payer: MEDICARE

## 2019-10-02 ENCOUNTER — HOSPITAL ENCOUNTER (OUTPATIENT)
Dept: INFUSION CENTER | Facility: CLINIC | Age: 79
Discharge: HOME | End: 2019-10-02
Payer: MEDICARE

## 2019-10-02 ENCOUNTER — TRANSCRIBE ORDERS (OUTPATIENT)
Dept: INFUSION CENTER | Facility: CLINIC | Age: 79
End: 2019-10-02

## 2019-10-02 DIAGNOSIS — D64.9 ANEMIA, UNSPECIFIED TYPE: Primary | ICD-10-CM

## 2019-10-02 DIAGNOSIS — Z45.2 ENCOUNTER FOR INSERTION OF VENOUS ACCESS PORT: Primary | ICD-10-CM

## 2019-10-02 DIAGNOSIS — D64.9 ANEMIA, UNSPECIFIED TYPE: ICD-10-CM

## 2019-10-02 DIAGNOSIS — Z51.81 ENCOUNTER FOR THERAPEUTIC DRUG MONITORING: ICD-10-CM

## 2019-10-02 DIAGNOSIS — L90.0 LICHEN SCLEROSUS: ICD-10-CM

## 2019-10-02 LAB
25(OH)D3 SERPL-MCNC: 8.2 NG/ML (ref 30–100)
ALBUMIN SERPL BCP-MCNC: 3.1 G/DL (ref 3.5–5)
ALT SERPL W P-5'-P-CCNC: 19 U/L (ref 12–78)
AST SERPL W P-5'-P-CCNC: 24 U/L (ref 5–45)
BASOPHILS # BLD AUTO: 0.02 THOUSANDS/ΜL (ref 0–0.1)
BASOPHILS NFR BLD AUTO: 0 % (ref 0–1)
CREAT SERPL-MCNC: 0.71 MG/DL (ref 0.6–1.3)
EOSINOPHIL # BLD AUTO: 0.21 THOUSAND/ΜL (ref 0–0.61)
EOSINOPHIL NFR BLD AUTO: 4 % (ref 0–6)
ERYTHROCYTE [DISTWIDTH] IN BLOOD BY AUTOMATED COUNT: 18.7 % (ref 11.6–15.1)
ERYTHROCYTE [SEDIMENTATION RATE] IN BLOOD: 50 MM/HOUR (ref 0–20)
FERRITIN SERPL-MCNC: 24 NG/ML (ref 8–388)
FOLATE SERPL-MCNC: 14.4 NG/ML (ref 3.1–17.5)
GFR SERPL CREATININE-BSD FRML MDRD: 82 ML/MIN/1.73SQ M
HCT VFR BLD AUTO: 25.4 % (ref 34.8–46.1)
HGB BLD-MCNC: 7.9 G/DL (ref 11.5–15.4)
LYMPHOCYTES # BLD AUTO: 0.75 THOUSANDS/ΜL (ref 0.6–4.47)
LYMPHOCYTES NFR BLD AUTO: 15 % (ref 14–44)
MCH RBC QN AUTO: 32.5 PG (ref 26.8–34.3)
MCHC RBC AUTO-ENTMCNC: 31.1 G/DL (ref 31.4–37.4)
MCV RBC AUTO: 105 FL (ref 82–98)
MONOCYTES # BLD AUTO: 0.55 THOUSAND/ΜL (ref 0.17–1.22)
MONOCYTES NFR BLD AUTO: 11 % (ref 4–12)
NEUTROPHILS # BLD AUTO: 3.5 THOUSANDS/ΜL (ref 1.85–7.62)
NEUTS SEG NFR BLD AUTO: 70 % (ref 43–75)
PLATELET # BLD AUTO: 158 THOUSANDS/UL (ref 149–390)
PMV BLD AUTO: 10.1 FL (ref 8.9–12.7)
RBC # BLD AUTO: 2.43 MILLION/UL (ref 3.81–5.12)
VIT B12 SERPL-MCNC: 336 PG/ML (ref 100–900)
WBC # BLD AUTO: 5.03 THOUSAND/UL (ref 4.31–10.16)

## 2019-10-02 PROCEDURE — 82607 VITAMIN B-12: CPT

## 2019-10-02 PROCEDURE — 36415 COLL VENOUS BLD VENIPUNCTURE: CPT

## 2019-10-02 PROCEDURE — 84460 ALANINE AMINO (ALT) (SGPT): CPT

## 2019-10-02 PROCEDURE — 85025 COMPLETE CBC W/AUTO DIFF WBC: CPT

## 2019-10-02 PROCEDURE — 82306 VITAMIN D 25 HYDROXY: CPT

## 2019-10-02 PROCEDURE — 85652 RBC SED RATE AUTOMATED: CPT

## 2019-10-02 PROCEDURE — 82040 ASSAY OF SERUM ALBUMIN: CPT

## 2019-10-02 PROCEDURE — 84450 TRANSFERASE (AST) (SGOT): CPT

## 2019-10-02 PROCEDURE — 82728 ASSAY OF FERRITIN: CPT

## 2019-10-02 PROCEDURE — 82565 ASSAY OF CREATININE: CPT

## 2019-10-02 PROCEDURE — 82746 ASSAY OF FOLIC ACID SERUM: CPT

## 2019-10-02 NOTE — PROGRESS NOTES
Pt here for lab draw, rcw port accessed, labs draw, port flushed and de accessed  Pt denied AVS, ambulated off unit with steady gait

## 2019-11-13 ENCOUNTER — HOSPITAL ENCOUNTER (OUTPATIENT)
Dept: INFUSION CENTER | Facility: CLINIC | Age: 79
Discharge: HOME | End: 2019-11-13
Payer: MEDICARE

## 2019-11-13 ENCOUNTER — TRANSCRIBE ORDERS (OUTPATIENT)
Dept: INFUSION CENTER | Facility: CLINIC | Age: 79
End: 2019-11-13

## 2019-11-13 ENCOUNTER — APPOINTMENT (OUTPATIENT)
Dept: LAB | Facility: CLINIC | Age: 79
End: 2019-11-13
Payer: MEDICARE

## 2019-11-13 DIAGNOSIS — Z45.2 ENCOUNTER FOR INSERTION OF VENOUS ACCESS PORT: Primary | ICD-10-CM

## 2019-11-13 DIAGNOSIS — Z85.42 H/O CANCER OF UTERUS: ICD-10-CM

## 2019-11-13 DIAGNOSIS — E13.69 OTHER SPECIFIED DIABETES MELLITUS WITH OTHER SPECIFIED COMPLICATION, UNSPECIFIED WHETHER LONG TERM INSULIN USE (HCC): ICD-10-CM

## 2019-11-13 DIAGNOSIS — Z51.81 ENCOUNTER FOR THERAPEUTIC DRUG MONITORING: ICD-10-CM

## 2019-11-13 DIAGNOSIS — E13.69 OTHER SPECIFIED DIABETES MELLITUS WITH OTHER SPECIFIED COMPLICATION, UNSPECIFIED WHETHER LONG TERM INSULIN USE (HCC): Primary | ICD-10-CM

## 2019-11-13 LAB
ALBUMIN SERPL BCP-MCNC: 3 G/DL (ref 3.5–5.7)
ALP SERPL-CCNC: 106 U/L (ref 46–116)
ALT SERPL W P-5'-P-CCNC: 15 U/L (ref 12–78)
ANION GAP SERPL CALCULATED.3IONS-SCNC: 9 MMOL/L (ref 4–13)
AST SERPL W P-5'-P-CCNC: 28 U/L (ref 5–45)
BASOPHILS # BLD AUTO: 0.02 THOUSANDS/ΜL (ref 0–0.1)
BASOPHILS NFR BLD AUTO: 0 % (ref 0–1)
BILIRUB SERPL-MCNC: 0.6 MG/DL (ref 0.2–1)
BUN SERPL-MCNC: 16 MG/DL (ref 5–25)
CALCIUM SERPL-MCNC: 8.2 MG/DL (ref 8.3–10.1)
CHLORIDE SERPL-SCNC: 107 MMOL/L (ref 98–108)
CO2 SERPL-SCNC: 24 MMOL/L (ref 21–32)
CREAT SERPL-MCNC: 0.7 MG/DL (ref 0.6–1.3)
EOSINOPHIL # BLD AUTO: 0.22 THOUSAND/ΜL (ref 0–0.61)
EOSINOPHIL NFR BLD AUTO: 4 % (ref 0–6)
ERYTHROCYTE [DISTWIDTH] IN BLOOD BY AUTOMATED COUNT: 15.6 % (ref 11.6–15.1)
ERYTHROCYTE [SEDIMENTATION RATE] IN BLOOD: 66 MM/HOUR (ref 0–20)
EST. AVERAGE GLUCOSE BLD GHB EST-MCNC: 174 MG/DL
GFR SERPL CREATININE-BSD FRML MDRD: 83 ML/MIN/1.73SQ M
GLUCOSE SERPL-MCNC: 231 MG/DL (ref 65–140)
HBA1C MFR BLD: 7.7 % (ref 4.2–6.3)
HCT VFR BLD AUTO: 24.7 % (ref 34.8–46.1)
HGB BLD-MCNC: 7.6 G/DL (ref 11.5–15.4)
LYMPHOCYTES # BLD AUTO: 1.11 THOUSANDS/ΜL (ref 0.6–4.47)
LYMPHOCYTES NFR BLD AUTO: 18 % (ref 14–44)
MCH RBC QN AUTO: 31.3 PG (ref 26.8–34.3)
MCHC RBC AUTO-ENTMCNC: 30.8 G/DL (ref 31.4–37.4)
MCV RBC AUTO: 102 FL (ref 82–98)
MONOCYTES # BLD AUTO: 0.58 THOUSAND/ΜL (ref 0.17–1.22)
MONOCYTES NFR BLD AUTO: 9 % (ref 4–12)
NEUTROPHILS # BLD AUTO: 4.21 THOUSANDS/ΜL (ref 1.85–7.62)
NEUTS SEG NFR BLD AUTO: 69 % (ref 43–75)
PLATELET # BLD AUTO: 157 THOUSANDS/UL (ref 149–390)
PMV BLD AUTO: 10.2 FL (ref 8.9–12.7)
POTASSIUM SERPL-SCNC: 3.9 MMOL/L (ref 3.5–5.3)
PROT SERPL-MCNC: 6.8 G/DL (ref 6.4–8.2)
RBC # BLD AUTO: 2.43 MILLION/UL (ref 3.81–5.12)
SODIUM SERPL-SCNC: 140 MMOL/L (ref 136–145)
WBC # BLD AUTO: 6.14 THOUSAND/UL (ref 4.31–10.16)

## 2019-11-13 PROCEDURE — 36415 COLL VENOUS BLD VENIPUNCTURE: CPT

## 2019-11-13 PROCEDURE — 85025 COMPLETE CBC W/AUTO DIFF WBC: CPT

## 2019-11-13 PROCEDURE — 80053 COMPREHEN METABOLIC PANEL: CPT

## 2019-11-13 PROCEDURE — 96523 IRRIG DRUG DELIVERY DEVICE: CPT

## 2019-11-13 PROCEDURE — 83036 HEMOGLOBIN GLYCOSYLATED A1C: CPT

## 2019-11-13 PROCEDURE — 85652 RBC SED RATE AUTOMATED: CPT

## 2019-11-13 NOTE — PROGRESS NOTES
Blood work collected via port a cath  Port flushed per protocol  Pt states she will call to schedule next port flush appointment   Refused AVS

## 2020-01-17 ENCOUNTER — TELEPHONE (OUTPATIENT)
Dept: GYNECOLOGIC ONCOLOGY | Facility: CLINIC | Age: 80
End: 2020-01-17

## 2020-01-17 DIAGNOSIS — Z85.42 H/O CANCER OF UTERUS: ICD-10-CM

## 2020-01-17 DIAGNOSIS — C55 CARCINOSARCOMA OF UTERUS (HCC): Primary | ICD-10-CM

## 2020-01-17 NOTE — TELEPHONE ENCOUNTER
Patient called asking to schedule port removal  Order was put in and patient is scheduled for 2/18/20 at [address] [facility] and will have phone consult on 2/13/20   Patient's daughter Kary Quintero also notified at patient request

## 2020-02-12 ENCOUNTER — TELEPHONE (OUTPATIENT)
Dept: RADIOLOGY | Facility: HOSPITAL | Age: 80
End: 2020-02-12

## 2020-02-12 RX ORDER — CEFAZOLIN SODIUM 1 G/50ML
1000 SOLUTION INTRAVENOUS ONCE
Status: CANCELLED | OUTPATIENT
Start: 2020-02-12 | End: 2020-02-12

## 2020-02-12 RX ORDER — SODIUM CHLORIDE 9 MG/ML
75 INJECTION, SOLUTION INTRAVENOUS CONTINUOUS
Status: CANCELLED | OUTPATIENT
Start: 2020-02-12

## 2020-02-12 NOTE — TELEPHONE ENCOUNTER
Reviewed procedure and prep with pt's daughter , Marge Casarez, who is her primary caretaker and transportation  Reviewed npo after MN day of, hold am insulin and glucophage  PM snack with Lantus evening prior

## 2020-02-17 ENCOUNTER — TELEPHONE (OUTPATIENT)
Dept: SURGERY | Facility: HOSPITAL | Age: 80
End: 2020-02-17

## 2020-02-18 ENCOUNTER — HOSPITAL ENCOUNTER (OUTPATIENT)
Dept: RADIOLOGY | Facility: HOSPITAL | Age: 80
Discharge: HOME | End: 2020-02-18
Admitting: RADIOLOGY
Payer: MEDICARE

## 2020-02-18 VITALS
OXYGEN SATURATION: 96 % | TEMPERATURE: 97.8 F | HEART RATE: 102 BPM | DIASTOLIC BLOOD PRESSURE: 63 MMHG | SYSTOLIC BLOOD PRESSURE: 155 MMHG | RESPIRATION RATE: 18 BRPM

## 2020-02-18 DIAGNOSIS — Z85.42 H/O CANCER OF UTERUS: ICD-10-CM

## 2020-02-18 LAB
INR PPP: 1.05 (ref 0.84–1.19)
PROTHROMBIN TIME: 13.8 SECONDS (ref 11.6–14.5)

## 2020-02-18 PROCEDURE — 99153 MOD SED SAME PHYS/QHP EA: CPT

## 2020-02-18 PROCEDURE — 99152 MOD SED SAME PHYS/QHP 5/>YRS: CPT | Performed by: RADIOLOGY

## 2020-02-18 PROCEDURE — 99152 MOD SED SAME PHYS/QHP 5/>YRS: CPT

## 2020-02-18 PROCEDURE — 85610 PROTHROMBIN TIME: CPT | Performed by: RADIOLOGY

## 2020-02-18 PROCEDURE — 36590 REMOVAL TUNNELED CV CATH: CPT | Performed by: RADIOLOGY

## 2020-02-18 PROCEDURE — 36590 REMOVAL TUNNELED CV CATH: CPT

## 2020-02-18 RX ORDER — CEFAZOLIN SODIUM 1 G/50ML
1000 SOLUTION INTRAVENOUS ONCE
Status: COMPLETED | OUTPATIENT
Start: 2020-02-18 | End: 2020-02-18

## 2020-02-18 RX ORDER — FENTANYL CITRATE 50 UG/ML
INJECTION, SOLUTION INTRAMUSCULAR; INTRAVENOUS CODE/TRAUMA/SEDATION MEDICATION
Status: COMPLETED | OUTPATIENT
Start: 2020-02-18 | End: 2020-02-18

## 2020-02-18 RX ORDER — SODIUM CHLORIDE 9 MG/ML
75 INJECTION, SOLUTION INTRAVENOUS CONTINUOUS
Status: DISCONTINUED | OUTPATIENT
Start: 2020-02-18 | End: 2020-02-19 | Stop reason: HOSPADM

## 2020-02-18 RX ORDER — MIDAZOLAM HYDROCHLORIDE 2 MG/2ML
INJECTION, SOLUTION INTRAMUSCULAR; INTRAVENOUS CODE/TRAUMA/SEDATION MEDICATION
Status: COMPLETED | OUTPATIENT
Start: 2020-02-18 | End: 2020-02-18

## 2020-02-18 RX ADMIN — FENTANYL CITRATE 25 MCG: 50 INJECTION INTRAMUSCULAR; INTRAVENOUS at 09:24

## 2020-02-18 RX ADMIN — MIDAZOLAM 0.5 MG: 1 INJECTION INTRAMUSCULAR; INTRAVENOUS at 09:24

## 2020-02-18 RX ADMIN — FENTANYL CITRATE 50 MCG: 50 INJECTION INTRAMUSCULAR; INTRAVENOUS at 09:15

## 2020-02-18 RX ADMIN — MIDAZOLAM 0.5 MG: 1 INJECTION INTRAMUSCULAR; INTRAVENOUS at 09:15

## 2020-02-18 RX ADMIN — CEFAZOLIN SODIUM 1000 MG: 1 SOLUTION INTRAVENOUS at 08:34

## 2020-02-18 RX ADMIN — SODIUM CHLORIDE 75 ML/HR: 0.9 INJECTION, SOLUTION INTRAVENOUS at 08:33

## 2020-02-18 NOTE — BRIEF OP NOTE (RAD/CATH)
IR PORT REMOVAL Procedure Note    PATIENT NAME: Jose Valerio  : 1940  MRN: [id number]    Pre-op Diagnosis:   1  H/O cancer of uterus      Post-op Diagnosis:   1   H/O cancer of uterus        Surgeon:   Karthik Moses MD  Assistants:     No qualified resident was available, Resident is only observing    Estimated Blood Loss: minimal  Findings: Right chest port removed    Specimens: none    Complications:  none    Anesthesia: Conscious sedation and Local    Karthik Moses MD     Date: 2020  Time: 9:45 AM

## 2020-02-18 NOTE — H&P
Interventional Radiology Ambulatory Visit 2/18/2020    Nettie Ray   1940   [id number]      Assessment/Plan:     Right chest port placed for chemotherapy to treat uterine cancer which is no longer needed  Will remove the port  Problem List Items Addressed This Visit        Other    H/O cancer of uterus    Relevant Orders    IR port Removal           Subjective:     HPI: Nettie Ray is a 79 y o  female who presents for removal of her right chest port  Review of Systems   Constitutional: Negative  Respiratory: Negative  Cardiovascular: Positive for palpitations  Gastrointestinal: Negative  Genitourinary: Negative  Past Medical History:   Diagnosis Date    Anemia     Arthritis, rheumatoid (HCC)     [facility] )     Diabetes mellitus ([facility] )     Hyperlipidemia     Hypertension     Radiation proctitis     Rheumatoid arthritis ([facility] )     Uterine cancer ([facility] )         Past Surgical History:   Procedure Laterality Date    HYSTERECTOMY          Social History     Tobacco Use   Smoking Status Former Smoker   Smokeless Tobacco Never Used        Social History     Substance and Sexual Activity   Alcohol Use No        Social History     Substance and Sexual Activity   Drug Use No        Allergies   Allergen Reactions    Lisinopril Cough     Other reaction(s): LISINOPRIL (Cough)    Other Rash     Has contact reaction to bandaids    Azithromycin Rash     Other reaction(s): AZITHROMYCIN (ZITHROMAX) (Rash)         Current Outpatient Medications   Medication Sig Dispense Refill    acetaminophen (TYLENOL) 500 mg tablet Take 1,000 mg by mouth every 8 (eight) hours as needed      albuterol (PROVENTIL HFA,VENTOLIN HFA) 90 mcg/act inhaler Inhale 2 puffs every 6 (six) hours as needed for wheezing        aspirin 81 MG tablet Take 81 mg by mouth daily       atorvastatin (LIPITOR) 10 mg tablet Take 40 mg by mouth daily       clobetasol (TEMOVATE) 0 05 % ointment Apply topically 2 (two) times a day For 1 week, then 3 days a week for 1 week, then once a week as needed (Patient not taking: Reported on 4/2/2019) 30 g 1    ferrous sulfate 325 (65 Fe) mg tablet Take 325 mg by mouth daily with breakfast      FLOVENT DISKUS 100 MCG/BLIST AEPB INHALE 1 PUFF 2 TIMES A DAY UNTIL COUGH RESOLVED  0    folic acid (FOLVITE) 1 mg tablet Take 1 mg by mouth daily   gabapentin (NEURONTIN) 100 mg capsule Take 100 mg by mouth daily at bedtime      insulin aspart (NovoLOG) 100 units/mL injection Inject 4-6 Units under the skin 3 (three) times a day before meals       insulin glargine (LANTUS) 100 units/mL subcutaneous injection Inject 36 Units under the skin daily       lidocaine-prilocaine (EMLA) cream Apply topically as needed for mild pain (Patient taking differently: Apply 1 application topically as needed for mild pain ) 30 g 1    LORazepam (ATIVAN) 1 mg tablet Take 0 5 mg by mouth 2 (two) times a day as needed for anxiety       LOSARTAN POTASSIUM PO Take 50 mg by mouth daily   metFORMIN (GLUCOPHAGE-XR) 500 mg 24 hr tablet Take 500 mg by mouth daily with breakfast      methotrexate 2 5 mg tablet Take 20 mg by mouth once a week Pt takes 8 tabs on Wednesdays      naproxen sodium (ALEVE) 220 MG tablet Take 220 mg by mouth every 12 (twelve) hours as needed       omeprazole (PriLOSEC) 20 mg delayed release capsule Take 20 mg by mouth daily as needed   pantoprazole (PROTONIX) 20 mg tablet Take 20 mg by mouth daily       Current Facility-Administered Medications   Medication Dose Route Frequency Provider Last Rate Last Dose    sodium chloride 0 9 % infusion  75 mL/hr Intravenous Continuous Arthurine MD Scott 75 mL/hr at 02/18/20 0833 75 mL/hr at 02/18/20 0833          Objective:    Vitals:    02/18/20 0900   BP: 161/74   Pulse: (!) 110   SpO2: 100%        Physical Exam   Constitutional: She is oriented to person, place, and time  She appears well-developed and well-nourished  No distress  Cardiovascular: Normal rate, normal heart sounds and intact distal pulses  An irregularly irregular rhythm present  Review of Care Anywhere indicates the patient is being evaluated for afib at [facility] and possibly a Watchman Device   Pulmonary/Chest: Effort normal and breath sounds normal    Abdominal: Soft  Bowel sounds are normal    Neurological: She is alert and oriented to person, place, and time  Skin: Skin is warm and dry  She is not diaphoretic  Lab Results   Component Value Date     10/28/2015    K 3 9 11/13/2019     11/13/2019    CO2 24 11/13/2019    ANIONGAP 9 10/28/2015    BUN 16 11/13/2019    CREATININE 0 70 11/13/2019    GLUCOSE 226 (H) 10/28/2015    GLUF 239 (H) 01/09/2019    CALCIUM 8 2 (L) 11/13/2019    AST 28 11/13/2019    ALT 15 11/13/2019    ALKPHOS 106 11/13/2019    PROT 6 3 (L) 10/28/2015    BILITOT 0 5 10/28/2015    EGFR 83 11/13/2019      Lab Results   Component Value Date    WBC 6 14 11/13/2019    HGB 7 6 (L) 11/13/2019    HCT 24 7 (L) 11/13/2019     (H) 11/13/2019     11/13/2019     Lab Results   Component Value Date    INR 1 05 02/18/2020    INR 1 18 (H) 06/15/2019    INR 0 99 02/04/2014    PROTIME 13 8 02/18/2020    PROTIME 15 1 (H) 06/15/2019    PROTIME 12 6 02/04/2014       I have personally reviewed pertinent imaging and laboratory results  This procedure has been fully reviewed with the patient and written informed consent has been obtained  Code Status: Prior  Advance Directive and Living Will:      Power of :    POLST:      IR has been consulted to evaluate the patient, determine the appropriate procedure, and whether or not a procedure can and should be performed  Thank you for allowing me to participate in the care of Charisluh Boswell  Please don't hesitate to call, text, email, or TigerText with any questions  This text is generated with voice recognition software   There may be translation, syntax,  or grammatical errors  If you have any questions, please contact the dictating provider

## 2020-02-18 NOTE — DISCHARGE INSTRUCTIONS
Implanted Venous Access Port Removal    WHAT YOU NEED TO KNOW:   An implanted venous access port is a device used to give treatments and take blood  It may also be called a central venous access device (CVAD)  The port is a small container that is placed under your skin, usually in your upper chest  A port can also be placed in your arm or abdomen  The port is attached to a catheter that enters a large vein  DISCHARGE INSTRUCTIONS:   Resume your normal diet  Small sips of flat soda will help with mild nausea  Prevent an infection:     Wash your hands often  Use soap and water  Clean your hands before and  after you care for your incision  Check your skin for infection every day  Look for redness, swelling, or fluid oozing from the incision site  Dressing may come off in 24 hours  Medical glue will peel off on its own in 5 to 10 days  You may shower 24 hours after procedure  Do not apply lotions to surgical area for 1 week  Follow up with your healthcare provider as directed  Write down your questions so you remember to ask them during your visits  Activity:  You may return to your daily activities when the area heals  Contact Interventional Radiology at [phone] [place] PATIENTS: Contact Interventional Radiology at [phone]) Eric Case PATIENTS: Contact Interventional Radiology at [phone]) if:     You have a fever  You have persistent nausea  Your inciscion site is red, swollen, or draining pus  You have questions or concerns about your condition or care  Seek care immediately or call 911 if:  Blood soaks through your bandage  The skin over or around your incision breaks open  Your heart is jumping or fluttering  You have a headache, blurred vision, and feel confused  You have pain in your arm, neck, shoulder, or chest     You have trouble breathing that is getting worse over time

## 2020-02-18 NOTE — SEDATION DOCUMENTATION
Upon connecting the patient to 3-Lead ECG monitoring, the patient's HR was variable and irregular  The VR was fluctuating between 88-120bpm   There was no discernable P-Wave  Patient being followed by cardiology for existing PAF  Dr Keesha Huerta made aware

## 2020-03-17 ENCOUNTER — OFFICE VISIT (OUTPATIENT)
Dept: GYNECOLOGIC ONCOLOGY | Facility: CLINIC | Age: 80
End: 2020-03-17
Payer: MEDICARE

## 2020-03-17 VITALS
WEIGHT: 165.6 LBS | SYSTOLIC BLOOD PRESSURE: 126 MMHG | TEMPERATURE: 97.5 F | BODY MASS INDEX: 30.47 KG/M2 | HEART RATE: 59 BPM | DIASTOLIC BLOOD PRESSURE: 84 MMHG | HEIGHT: 62 IN

## 2020-03-17 DIAGNOSIS — Z85.42 H/O CANCER OF UTERUS: ICD-10-CM

## 2020-03-17 DIAGNOSIS — R10.2 PELVIC PAIN: Primary | ICD-10-CM

## 2020-03-17 DIAGNOSIS — R30.0 DYSURIA: ICD-10-CM

## 2020-03-17 PROCEDURE — 99214 OFFICE O/P EST MOD 30 MIN: CPT | Performed by: OBSTETRICS & GYNECOLOGY

## 2020-03-17 NOTE — ASSESSMENT & PLAN NOTE
Clinically, she has no evidence of disease  I plan to see her back in 1 year for routine surveillance visit

## 2020-03-17 NOTE — PROGRESS NOTES
Assessment/Plan:    Problem List Items Addressed This Visit        Other    H/O cancer of uterus     Clinically, she has no evidence of disease  I plan to see her back in 1 year for routine surveillance visit  Relevant Orders    UA w Reflex to Microscopic w Reflex to Culture    Creatinine, serum    BUN    CT abdomen pelvis w contrast    Pelvic pain - Primary     Given history of cancer of the uterus, persistent pelvic pain which reports is worsening will obtain CT scan of the abdomen and pelvis to further evaluate  Relevant Orders    UA w Reflex to Microscopic w Reflex to Culture    Creatinine, serum    BUN    CT abdomen pelvis w contrast    Dysuria     With bladder pressure and worsening incontinence  Will rule out urinary tract infection  Ordered urinalysis with reflex culture  Relevant Orders    UA w Reflex to Microscopic w Reflex to Culture            CHIEF COMPLAINT:   Here complaining of pelvic pain/pressure, dysuria and worsening urinary incontinence  Worried about possibility of cancer recurrence  Previous therapy:     H/O cancer of uterus    1/13/2014 Surgery     Robotic-assisted extensive lysis of adhesions, total hysterectomy, bilateral salpingo-oophorectomy, omentectomy, pelvic and periaortic lymphadenectomy  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 and 1 out of 15 positive pelvic lymph nodes  Positive washings  - 7/11/2014 Chemotherapy     Adjuvant chemotherapy + RT: s/p RT after 3 cycles of carbo/taxol (sandwiched plan) plus x3 cycles of post-RT chemo (sandwiched)  Completed July 11th 2014  Flexible sigmoidoscopy November 2017 (Dr Jorge Hope): no evidence of polyps or malignancy  Radiation changes noted              Patient ID: Nicholas Boggs is a 79 y o  female  HPI  69-year-old with urinary incontinence at baseline as well as insulin-dependent diabetes, COPD, hyperlipidemia and history of uterine cancer now 5 years out from completion of therapy  She was last seen in July and we plan for yearly surveillance  Reports 2-3 week history of worsening urinary incontinence with associated pelvic pain which she describes as pressure, moderate to severe, pulling bladder sensation and burning/pressure with urination  Denies fever  Denies changes in bowel or bladder function  Denies blood per vagina, bladder or rectum  The following portions of the patient's history were reviewed and updated as appropriate: allergies, current medications, past family history, past medical history, past social history, past surgical history and problem list     Review of Systems  As above  Shortness of breath on exertion at baseline  Twelve point review of systems is otherwise noncontributory  Current Outpatient Medications   Medication Sig Dispense Refill    albuterol (PROVENTIL HFA,VENTOLIN HFA) 90 mcg/act inhaler Inhale 2 puffs every 6 (six) hours as needed for wheezing   aspirin 81 MG tablet Take 81 mg by mouth daily       atorvastatin (LIPITOR) 10 mg tablet Take 40 mg by mouth daily       ferrous sulfate 325 (65 Fe) mg tablet Take 325 mg by mouth daily with breakfast      FLOVENT DISKUS 100 MCG/BLIST AEPB INHALE 1 PUFF 2 TIMES A DAY UNTIL COUGH RESOLVED  0    folic acid (FOLVITE) 1 mg tablet Take 1 mg by mouth daily   gabapentin (NEURONTIN) 100 mg capsule Take 100 mg by mouth daily at bedtime      insulin aspart (NovoLOG) 100 units/mL injection Inject 4-6 Units under the skin 3 (three) times a day before meals       insulin glargine (LANTUS) 100 units/mL subcutaneous injection Inject 36 Units under the skin daily       LORazepam (ATIVAN) 1 mg tablet Take 0 5 mg by mouth 2 (two) times a day as needed for anxiety       LOSARTAN POTASSIUM PO Take 50 mg by mouth daily        methotrexate 2 5 mg tablet Take 20 mg by mouth once a week Pt takes 8 tabs on Wednesdays      naproxen sodium (ALEVE) 220 MG tablet Take 220 mg by mouth every 12 (twelve) hours as needed       pantoprazole (PROTONIX) 20 mg tablet Take 20 mg by mouth daily      acetaminophen (TYLENOL) 500 mg tablet Take 1,000 mg by mouth every 8 (eight) hours as needed      clobetasol (TEMOVATE) 0 05 % ointment Apply topically 2 (two) times a day For 1 week, then 3 days a week for 1 week, then once a week as needed (Patient not taking: Reported on 4/2/2019) 30 g 1    lidocaine-prilocaine (EMLA) cream Apply topically as needed for mild pain (Patient not taking: Reported on 3/17/2020) 30 g 1    metFORMIN (GLUCOPHAGE-XR) 500 mg 24 hr tablet Take 500 mg by mouth daily with breakfast      omeprazole (PriLOSEC) 20 mg delayed release capsule Take 20 mg by mouth daily as needed  No current facility-administered medications for this visit  Objective:    Blood pressure 126/84, pulse 59, temperature 97 5 °F (36 4 °C), temperature source Oral, height 5' 2" (1 575 m), weight 75 1 kg (165 lb 9 6 oz)  Body mass index is 30 29 kg/m²  Body surface area is 1 76 meters squared  Physical Exam   Constitutional: No distress  Neck: Normal range of motion  Neck supple  No tracheal deviation present  No thyromegaly present  Cardiovascular: Normal rate, regular rhythm and normal heart sounds  No murmur heard  Pulmonary/Chest: Effort normal  No stridor  No respiratory distress  She has rales  Abdominal: Soft  She exhibits no distension and no mass  There is no tenderness  There is no guarding  Genitourinary:   Genitourinary Comments: Normal external genitalia  Vagina is almost completely agglutinated after radiotherapy  Atrophic  Markedly foreshortened  No evidence of bleeding or tumor  No evidence of pelvic organ prolapse  I cannot elicit stress urinary incontinence  Rectovaginal exam demonstrates diffuse indurated but smooth parametria  No discrete tumor    Bimanual palpation of the bladder elicits pressure and discomfort  Anus with no fissures  Anal canal and rectum with no intraluminal mass or tumor  No blood  Lymphadenopathy:     She has no cervical adenopathy          Sherly Carter MD, [place], [facility]  3/17/2020  11:30 AM

## 2020-03-17 NOTE — ASSESSMENT & PLAN NOTE
With bladder pressure and worsening incontinence  Will rule out urinary tract infection  Ordered urinalysis with reflex culture

## 2020-03-17 NOTE — ASSESSMENT & PLAN NOTE
Given history of cancer of the uterus, persistent pelvic pain which reports is worsening will obtain CT scan of the abdomen and pelvis to further evaluate

## 2021-02-12 DIAGNOSIS — Z23 ENCOUNTER FOR IMMUNIZATION: ICD-10-CM

## 2021-03-23 ENCOUNTER — OFFICE VISIT (OUTPATIENT)
Dept: GYNECOLOGIC ONCOLOGY | Facility: CLINIC | Age: 81
End: 2021-03-23
Payer: MEDICARE

## 2021-03-23 VITALS
RESPIRATION RATE: 18 BRPM | BODY MASS INDEX: 30.55 KG/M2 | HEART RATE: 77 BPM | HEIGHT: 62 IN | DIASTOLIC BLOOD PRESSURE: 62 MMHG | TEMPERATURE: 98.4 F | WEIGHT: 166 LBS | SYSTOLIC BLOOD PRESSURE: 122 MMHG

## 2021-03-23 DIAGNOSIS — B37.9 CANDIDIASIS: Primary | ICD-10-CM

## 2021-03-23 DIAGNOSIS — Z12.31 ENCOUNTER FOR SCREENING MAMMOGRAM FOR MALIGNANT NEOPLASM OF BREAST: ICD-10-CM

## 2021-03-23 DIAGNOSIS — Z85.42 H/O CANCER OF UTERUS: ICD-10-CM

## 2021-03-23 DIAGNOSIS — M80.08XA AGE-RELATED OSTEOPOROSIS WITH CURRENT PATHOLOGICAL FRACTURE, VERTEBRA(E), INITIAL ENCOUNTER FOR FRACTURE (HCC): ICD-10-CM

## 2021-03-23 DIAGNOSIS — N64.89 OTHER SPECIFIED DISORDERS OF BREAST: ICD-10-CM

## 2021-03-23 DIAGNOSIS — Z13.820 SCREENING FOR OSTEOPOROSIS: ICD-10-CM

## 2021-03-23 PROBLEM — Z45.2 ENCOUNTER FOR INSERTION OF VENOUS ACCESS PORT: Status: RESOLVED | Noted: 2019-04-29 | Resolved: 2021-03-23

## 2021-03-23 PROBLEM — R10.2 PELVIC PAIN: Status: RESOLVED | Noted: 2019-07-23 | Resolved: 2021-03-23

## 2021-03-23 PROBLEM — R30.0 DYSURIA: Status: RESOLVED | Noted: 2020-03-17 | Resolved: 2021-03-23

## 2021-03-23 PROBLEM — K62.5 RECTAL BLEEDING: Status: RESOLVED | Noted: 2019-07-23 | Resolved: 2021-03-23

## 2021-03-23 PROCEDURE — 99213 OFFICE O/P EST LOW 20 MIN: CPT | Performed by: OBSTETRICS & GYNECOLOGY

## 2021-03-23 RX ORDER — FLUCONAZOLE 150 MG/1
150 TABLET ORAL ONCE
Qty: 1 TABLET | Refills: 0 | Status: SHIPPED | OUTPATIENT
Start: 2021-03-23 | End: 2021-03-23

## 2021-03-23 NOTE — PATIENT INSTRUCTIONS
Keep appointment for mammogram and bone density test   Start calcium/vitamin-D supplement twice a day with breakfast and dinner  Contact us if you have any vaginal bleeding, drainage, discharge or new symptoms

## 2021-03-23 NOTE — PROGRESS NOTES
Assessment/Plan:    Problem List Items Addressed This Visit        Other    H/O cancer of uterus       Seven years out after completion of treatment  No evidence of disease  Candidiasis - Primary      Vulvovaginal itching and scant whitish frothy discharge  Will treat empirically for candidiasis with Diflucan p o  x1          Relevant Medications    fluconazole (DIFLUCAN) 150 mg tablet    Encounter for screening mammogram for malignant neoplasm of breast      Due for mammogram   Mammogram ordered  Relevant Orders    Mammo diagnostic bilateral w 3d & cad    Screening for osteoporosis      High risk for osteoporosis on the basis of age, smoking, PPI use, etc   Emphasized importance of calcium/ vitamin-D supplementation  Will order bone density test / DXA  Relevant Orders    DXA bone density spine hip and pelvis      Other Visit Diagnoses     Age-related osteoporosis with current pathological fracture, vertebra(e), initial encounter for fracture (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 and 1 out of 15 positive pelvic lymph nodes  Positive washings  - 7/11/2014 Chemotherapy    Adjuvant chemotherapy + RT: s/p RT after 3 cycles of carbo/taxol (sandwiched plan) plus x3 cycles of post-RT chemo (sandwiched)  Completed July 11th 2014       Flexible sigmoidoscopy November 2017 (Dr Reynaldo Davis): no evidence of polyps or malignancy  Radiation changes noted  Patient ID: Maeve Hansen is a [de-identified] y o  female  [facility]    Patient with stage IIIC2 carcinosarcoma of the uterus status post surgery and adjuvant   Radiotherapy and chemotherapy completed in July 2014  She has remained with no evidence of disease  Presents for follow-up  Reports vulvar itching  Denies vaginal bleeding, drainage or discharge  Reports nonspecific excoriation and mid abdomen  Otherwise no complaints  The following portions of the patient's history were reviewed and updated as appropriate: allergies, current medications, past family history, past medical history, past social history, past surgical history and problem list     Review of Systems    As per [facility]  Twelve point review of systems is otherwise unremarkable  Current Outpatient Medications   Medication Sig Dispense Refill    acetaminophen (TYLENOL) 500 mg tablet Take 1,000 mg by mouth every 8 (eight) hours as needed      albuterol (PROVENTIL HFA,VENTOLIN HFA) 90 mcg/act inhaler Inhale 2 puffs every 6 (six) hours as needed for wheezing   aspirin 81 MG tablet Take 81 mg by mouth daily       atorvastatin (LIPITOR) 10 mg tablet Take 40 mg by mouth daily       ferrous sulfate 325 (65 Fe) mg tablet Take 325 mg by mouth daily with breakfast      FLOVENT DISKUS 100 MCG/BLIST AEPB INHALE 1 PUFF 2 TIMES A DAY UNTIL COUGH RESOLVED  0    folic acid (FOLVITE) 1 mg tablet Take 1 mg by mouth daily   gabapentin (NEURONTIN) 100 mg capsule Take 100 mg by mouth daily at bedtime      insulin aspart (NovoLOG) 100 units/mL injection Inject 4-6 Units under the skin 3 (three) times a day before meals       insulin glargine (LANTUS) 100 units/mL subcutaneous injection Inject 36 Units under the skin daily       LORazepam (ATIVAN) 1 mg tablet Take 0 5 mg by mouth 2 (two) times a day as needed for anxiety       LOSARTAN POTASSIUM PO Take 50 mg by mouth daily        metFORMIN (GLUCOPHAGE-XR) 500 mg 24 hr tablet Take 500 mg by mouth daily with breakfast      methotrexate 2 5 mg tablet Take 20 mg by mouth once a week Pt takes 8 tabs on Wednesdays      naproxen sodium (ALEVE) 220 MG tablet Take 220 mg by mouth every 12 (twelve) hours as needed       omeprazole (PriLOSEC) 20 mg delayed release capsule Take 20 mg by mouth daily as needed   pantoprazole (PROTONIX) 20 mg tablet Take 20 mg by mouth daily      clobetasol (TEMOVATE) 0 05 % ointment Apply topically 2 (two) times a day For 1 week, then 3 days a week for 1 week, then once a week as needed (Patient not taking: Reported on 4/2/2019) 30 g 1    fluconazole (DIFLUCAN) 150 mg tablet Take 1 tablet (150 mg total) by mouth once for 1 dose 1 tablet 0    lidocaine-prilocaine (EMLA) cream Apply topically as needed for mild pain (Patient not taking: Reported on 3/17/2020) 30 g 1     No current facility-administered medications for this visit  Objective:    Blood pressure 122/62, pulse 77, temperature 98 4 °F (36 9 °C), temperature source Temporal, resp  rate 18, height 5' 2" (1 575 m), weight 75 3 kg (166 lb)  Body mass index is 30 36 kg/m²  Body surface area is 1 77 meters squared  Physical Exam  Vitals signs reviewed  Exam conducted with a chaperone present  Eyes:      General: No scleral icterus  Right eye: No discharge  Left eye: No discharge  Conjunctiva/sclera: Conjunctivae normal       Pupils: Pupils are equal, round, and reactive to light  Abdominal:      General: Abdomen is flat  There is no distension  Palpations: Abdomen is soft  Tenderness: There is no abdominal tenderness  There is no guarding  Comments: Approximately 2 cm superficial excoriation mid abdomen, no suspicious lesions  Genitourinary:     Comments: Normal external female genitalia  Normal [place]'s [place]'s glands  Normal urethral meatus and no evidence of urethral discharge or masses    Bladder without fullness mass or tenderness  Vagina without lesion or discharge  No significant pelvic organ prolapse noted  Cervix and uterus are surgically absent  Bimanual exam demonstrates no evidence of pelvic masses  Anus without fissure of lesion          Rogerio Thurman MD, [address]  3/23/2021  9:50 AM

## 2021-03-23 NOTE — ASSESSMENT & PLAN NOTE
High risk for osteoporosis on the basis of age, smoking, PPI use, etc   Emphasized importance of calcium/ vitamin-D supplementation  Will order bone density test / DXA

## 2021-03-23 NOTE — ASSESSMENT & PLAN NOTE
Vulvovaginal itching and scant whitish frothy discharge    Will treat empirically for candidiasis with Diflucan p o  x1

## 2021-03-25 ENCOUNTER — HOSPITAL ENCOUNTER (OUTPATIENT)
Dept: BONE DENSITY | Facility: MEDICAL CENTER | Age: 81
Discharge: HOME | End: 2021-03-25
Payer: MEDICARE

## 2021-03-25 DIAGNOSIS — M80.08XA AGE-RELATED OSTEOPOROSIS WITH CURRENT PATHOLOGICAL FRACTURE, VERTEBRA(E), INITIAL ENCOUNTER FOR FRACTURE (HCC): ICD-10-CM

## 2021-03-25 DIAGNOSIS — Z13.820 SCREENING FOR OSTEOPOROSIS: ICD-10-CM

## 2021-03-25 PROCEDURE — 77080 DXA BONE DENSITY AXIAL: CPT

## 2021-03-31 DIAGNOSIS — M81.0 AGE-RELATED OSTEOPOROSIS WITHOUT CURRENT PATHOLOGICAL FRACTURE: Primary | ICD-10-CM

## 2021-03-31 RX ORDER — MELATONIN
1000 DAILY
Qty: 30 TABLET | Refills: 5 | Status: SHIPPED | OUTPATIENT
Start: 2021-03-31

## 2021-04-07 DIAGNOSIS — Z12.31 ENCOUNTER FOR SCREENING MAMMOGRAM FOR BREAST CANCER: Primary | ICD-10-CM

## 2021-04-20 ENCOUNTER — HOSPITAL ENCOUNTER (OUTPATIENT)
Dept: MAMMOGRAPHY | Facility: CLINIC | Age: 81
Discharge: HOME | End: 2021-04-20
Payer: MEDICARE

## 2021-04-20 VITALS — BODY MASS INDEX: 30.55 KG/M2 | HEIGHT: 62 IN | WEIGHT: 166 LBS

## 2021-04-20 DIAGNOSIS — Z12.31 ENCOUNTER FOR SCREENING MAMMOGRAM FOR BREAST CANCER: ICD-10-CM

## 2021-04-20 PROCEDURE — 77063 BREAST TOMOSYNTHESIS BI: CPT

## 2021-04-20 PROCEDURE — 77067 SCR MAMMO BI INCL CAD: CPT

## 2021-04-26 DIAGNOSIS — M81.0 OSTEOPOROSIS WITHOUT CURRENT PATHOLOGICAL FRACTURE, UNSPECIFIED OSTEOPOROSIS TYPE: Primary | ICD-10-CM

## 2021-04-26 RX ORDER — SODIUM CHLORIDE 9 MG/ML
20 INJECTION, SOLUTION INTRAVENOUS ONCE
Status: CANCELLED | OUTPATIENT
Start: 2021-05-07

## 2021-04-26 RX ORDER — ZOLEDRONIC ACID 5 MG/100ML
5 INJECTION, SOLUTION INTRAVENOUS ONCE
Status: CANCELLED | OUTPATIENT
Start: 2021-05-07

## 2021-05-07 ENCOUNTER — HOSPITAL ENCOUNTER (OUTPATIENT)
Dept: INFUSION CENTER | Facility: CLINIC | Age: 81
Discharge: HOME | End: 2021-05-07

## 2021-05-14 ENCOUNTER — HOSPITAL ENCOUNTER (OUTPATIENT)
Dept: INFUSION CENTER | Facility: CLINIC | Age: 81
Discharge: HOME | End: 2021-05-14
Payer: MEDICARE

## 2021-05-14 ENCOUNTER — APPOINTMENT (OUTPATIENT)
Dept: LAB | Facility: HOSPITAL | Age: 81
End: 2021-05-14
Payer: MEDICARE

## 2021-05-14 VITALS
BODY MASS INDEX: 30.32 KG/M2 | SYSTOLIC BLOOD PRESSURE: 152 MMHG | TEMPERATURE: 98.6 F | WEIGHT: 165.79 LBS | DIASTOLIC BLOOD PRESSURE: 73 MMHG | RESPIRATION RATE: 18 BRPM | HEART RATE: 90 BPM

## 2021-05-14 DIAGNOSIS — M81.0 OSTEOPOROSIS WITHOUT CURRENT PATHOLOGICAL FRACTURE, UNSPECIFIED OSTEOPOROSIS TYPE: Primary | ICD-10-CM

## 2021-05-14 DIAGNOSIS — D64.9 SYMPTOMATIC ANEMIA: Primary | ICD-10-CM

## 2021-05-14 DIAGNOSIS — D64.9 SYMPTOMATIC ANEMIA: ICD-10-CM

## 2021-05-14 LAB
ALBUMIN SERPL BCP-MCNC: 3.4 G/DL (ref 3.5–5)
ALP SERPL-CCNC: 138 U/L (ref 46–116)
ALT SERPL W P-5'-P-CCNC: 46 U/L (ref 12–78)
ANION GAP SERPL CALCULATED.3IONS-SCNC: 9 MMOL/L (ref 4–13)
AST SERPL W P-5'-P-CCNC: 45 U/L (ref 5–45)
BILIRUB SERPL-MCNC: 0.75 MG/DL (ref 0.2–1)
BUN SERPL-MCNC: 16 MG/DL (ref 5–25)
CALCIUM ALBUM COR SERPL-MCNC: 9.4 MG/DL (ref 8.3–10.1)
CALCIUM SERPL-MCNC: 8.9 MG/DL (ref 8.3–10.1)
CHLORIDE SERPL-SCNC: 104 MMOL/L (ref 100–108)
CO2 SERPL-SCNC: 28 MMOL/L (ref 21–32)
CREAT SERPL-MCNC: 0.71 MG/DL (ref 0.6–1.3)
GFR SERPL CREATININE-BSD FRML MDRD: 81 ML/MIN/1.73SQ M
GLUCOSE SERPL-MCNC: 234 MG/DL (ref 65–140)
POTASSIUM SERPL-SCNC: 4.3 MMOL/L (ref 3.5–5.3)
PROT SERPL-MCNC: 7.1 G/DL (ref 6.4–8.2)
SODIUM SERPL-SCNC: 141 MMOL/L (ref 136–145)

## 2021-05-14 PROCEDURE — 80053 COMPREHEN METABOLIC PANEL: CPT

## 2021-05-14 PROCEDURE — 36415 COLL VENOUS BLD VENIPUNCTURE: CPT

## 2021-05-14 PROCEDURE — 96374 THER/PROPH/DIAG INJ IV PUSH: CPT

## 2021-05-14 RX ORDER — ZOLEDRONIC ACID 5 MG/100ML
5 INJECTION, SOLUTION INTRAVENOUS ONCE
Status: COMPLETED | OUTPATIENT
Start: 2021-05-14 | End: 2021-05-14

## 2021-05-14 RX ORDER — SODIUM CHLORIDE 9 MG/ML
20 INJECTION, SOLUTION INTRAVENOUS ONCE
Status: COMPLETED | OUTPATIENT
Start: 2021-05-14 | End: 2021-05-14

## 2021-05-14 RX ORDER — SODIUM CHLORIDE 9 MG/ML
20 INJECTION, SOLUTION INTRAVENOUS ONCE
Status: CANCELLED | OUTPATIENT
Start: 2022-05-14

## 2021-05-14 RX ORDER — ZOLEDRONIC ACID 5 MG/100ML
5 INJECTION, SOLUTION INTRAVENOUS ONCE
Status: CANCELLED | OUTPATIENT
Start: 2022-05-14

## 2021-05-14 RX ADMIN — SODIUM CHLORIDE 20 ML/HR: 0.9 INJECTION, SOLUTION INTRAVENOUS at 14:00

## 2021-05-14 RX ADMIN — ZOLEDRONIC ACID 5 MG: 5 SOLUTION INTRAVENOUS at 14:06

## 2021-05-14 NOTE — PROGRESS NOTES
Pt  Denies new symptoms or concerns today  Labs reviewed  Ca+ 8 9; CrCl 60 1  Reclast ordered for infusion today

## 2021-05-14 NOTE — PROGRESS NOTES
Pt  Tolerated Reclast without adverse event  Future appointment scheduled for 1 year as ordered  AVS provided

## 2021-05-17 DIAGNOSIS — R73.09 ELEVATED GLUCOSE: Primary | ICD-10-CM

## 2022-03-29 ENCOUNTER — OFFICE VISIT (OUTPATIENT)
Dept: GYNECOLOGIC ONCOLOGY | Facility: CLINIC | Age: 82
End: 2022-03-29
Payer: MEDICARE

## 2022-03-29 VITALS
HEIGHT: 62 IN | BODY MASS INDEX: 30.91 KG/M2 | WEIGHT: 168 LBS | SYSTOLIC BLOOD PRESSURE: 155 MMHG | DIASTOLIC BLOOD PRESSURE: 76 MMHG | RESPIRATION RATE: 16 BRPM | OXYGEN SATURATION: 100 % | TEMPERATURE: 97 F | HEART RATE: 61 BPM

## 2022-03-29 DIAGNOSIS — Z12.31 ENCOUNTER FOR SCREENING MAMMOGRAM FOR MALIGNANT NEOPLASM OF BREAST: ICD-10-CM

## 2022-03-29 DIAGNOSIS — Z85.42 H/O CANCER OF UTERUS: Primary | ICD-10-CM

## 2022-03-29 DIAGNOSIS — K43.9 VENTRAL HERNIA WITHOUT OBSTRUCTION OR GANGRENE: ICD-10-CM

## 2022-03-29 DIAGNOSIS — B37.9 CANDIDIASIS: ICD-10-CM

## 2022-03-29 DIAGNOSIS — M81.0 OSTEOPOROSIS WITHOUT CURRENT PATHOLOGICAL FRACTURE, UNSPECIFIED OSTEOPOROSIS TYPE: ICD-10-CM

## 2022-03-29 PROBLEM — Z13.820 SCREENING FOR OSTEOPOROSIS: Status: RESOLVED | Noted: 2021-03-23 | Resolved: 2022-03-29

## 2022-03-29 PROCEDURE — 99213 OFFICE O/P EST LOW 20 MIN: CPT | Performed by: OBSTETRICS & GYNECOLOGY

## 2022-03-29 RX ORDER — KETOCONAZOLE 20 MG/G
CREAM TOPICAL
Qty: 15 G | Refills: 1 | Status: SHIPPED | OUTPATIENT
Start: 2022-03-29

## 2022-03-29 NOTE — ASSESSMENT & PLAN NOTE
Asymptomatic  Given age and comorbidities as well as absence of symptoms patient is not interested in proceeding with evaluation or potential treatment  Continue to monitor  Precautions given

## 2022-03-29 NOTE — PROGRESS NOTES
Assessment/Plan:    Problem List Items Addressed This Visit        Musculoskeletal and Integument    Osteoporosis     Diagnosed by DEXA scan I ordered in the spring of 2021  In May of 2021 she received 1st dose of zoledronic acid  She is scheduled for repeat yearly does later this spring  I emphasized importance of calcium and vitamin-D supplementation  Consider repeat DEXA scan in 2023  Other    H/O cancer of uterus - Primary     She remains with no evidence of disease  No additional interventions required this point  Candidiasis     Patient with evidence of intertrigo in abdominal fold and below breasts  I will prescribe ketoconazole and instructed her to use it twice a day for 14 days  Patient started to keep area clean and dry  Relevant Medications    ketoconazole (NIZORAL) 2 % cream    Encounter for screening mammogram for malignant neoplasm of breast     Due for mammogram   Mammogram ordered  Relevant Orders    Mammo screening bilateral w 3d & cad    Ventral hernia without obstruction or gangrene     Asymptomatic  Given age and comorbidities as well as absence of symptoms patient is not interested in proceeding with evaluation or potential treatment  Continue to monitor  Precautions given  CHIEF COMPLAINT:   Follow-up for month history of uterine carcinosarcoma, complaints of skin irritation itching, follow-up for osteoporosis, due for mammogram     Problem:  Cancer Staging  No matching staging information was found for the patient  Previous therapy:  Oncology History   H/O cancer of uterus   1/13/2014 Surgery    Robotic-assisted extensive lysis of adhesions, total hysterectomy, bilateral salpingo-oophorectomy, omentectomy, pelvic and periaortic lymphadenectomy  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 and 1 out of 15 positive pelvic lymph nodes   Positive washings  - 7/11/2014 Chemotherapy    Adjuvant chemotherapy + RT: s/p RT after 3 cycles of carbo/taxol (sandwiched plan) plus x3 cycles of post-RT chemo (sandwiched)  Completed July 11th 2014  Flexible sigmoidoscopy November 2017 (Dr Winnie Miller): no evidence of polyps or malignancy  Radiation changes noted  Patient ID: Lay Pandey is a 81 y o  female  HPI  Patient well known to me with history of stage IIIC uterine carcinosarcoma  She is status post curative intent treatment with surgery and chemo radiotherapy  Completed treatment in July 2014  Presents for yearly follow-up  Denies vaginal bleeding, drainage or discharge  Complains of abdominal skin rash with irritation and itching  Last year's DEXA scan demonstrated osteoporosis  She remains on calcium and vitamin-D supplementation  Received zoledronic acid infusion in May of 2022  Due for mammogram at this time  Last year's mammogram was negative  The following portions of the patient's history were reviewed and updated as appropriate: allergies, current medications, past family history, past medical history, past social history, past surgical history and problem list     Review of Systems  As per [facility]  Twelve point review of systems otherwise unremarkable  Current Outpatient Medications   Medication Sig Dispense Refill    acetaminophen (TYLENOL) 500 mg tablet Take 1,000 mg by mouth every 8 (eight) hours as needed      albuterol (PROVENTIL HFA,VENTOLIN HFA) 90 mcg/act inhaler Inhale 2 puffs every 6 (six) hours as needed for wheezing        aspirin 81 MG tablet Take 81 mg by mouth daily       atorvastatin (LIPITOR) 10 mg tablet Take 40 mg by mouth daily       cholecalciferol (VITAMIN D3) 1,000 units tablet Take 1 tablet (1,000 Units total) by mouth daily 30 tablet 5    ferrous sulfate 325 (65 Fe) mg tablet Take 325 mg by mouth daily with breakfast      FLOVENT DISKUS 100 MCG/BLIST AEPB INHALE 1 PUFF 2 TIMES A DAY UNTIL COUGH RESOLVED  0    folic acid (FOLVITE) 1 mg tablet Take 1 mg by mouth daily   gabapentin (NEURONTIN) 100 mg capsule Take 100 mg by mouth daily at bedtime      insulin aspart (NovoLOG) 100 units/mL injection Inject 4-6 Units under the skin 3 (three) times a day before meals       insulin glargine (LANTUS) 100 units/mL subcutaneous injection Inject 36 Units under the skin daily       LORazepam (ATIVAN) 1 mg tablet Take 0 5 mg by mouth 2 (two) times a day as needed for anxiety       LOSARTAN POTASSIUM PO Take 50 mg by mouth daily   metFORMIN (GLUCOPHAGE-XR) 500 mg 24 hr tablet Take 500 mg by mouth daily with breakfast      methotrexate 2 5 mg tablet Take 20 mg by mouth once a week Pt takes 8 tabs on Wednesdays      naproxen sodium (ALEVE) 220 MG tablet Take 220 mg by mouth every 12 (twelve) hours as needed       omeprazole (PriLOSEC) 20 mg delayed release capsule Take 20 mg by mouth daily as needed   pantoprazole (PROTONIX) 20 mg tablet Take 20 mg by mouth daily      clobetasol (TEMOVATE) 0 05 % ointment Apply topically 2 (two) times a day For 1 week, then 3 days a week for 1 week, then once a week as needed (Patient not taking: Reported on 4/2/2019) 30 g 1    ketoconazole (NIZORAL) 2 % cream Apply to affected areas twice a day for 14 days  Keep areas clean and dry  15 g 1    lidocaine-prilocaine (EMLA) cream Apply topically as needed for mild pain (Patient not taking: Reported on 3/17/2020) 30 g 1     No current facility-administered medications for this visit  Objective:    Blood pressure 155/76, pulse 61, temperature (!) 97 °F (36 1 °C), temperature source Temporal, resp  rate 16, height 5' 2" (1 575 m), weight 76 2 kg (168 lb), SpO2 100 %  Body mass index is 30 73 kg/m²  Body surface area is 1 77 meters squared  Physical Exam  Vitals reviewed  Exam conducted with a chaperone present  Eyes:      General: No scleral icterus  Right eye: No discharge           Left eye: No discharge  Conjunctiva/sclera: Conjunctivae normal    Pulmonary:      Effort: Pulmonary effort is normal  No respiratory distress  Abdominal:      General: Abdomen is flat  Palpations: Abdomen is soft  There is no mass  Tenderness: There is no guarding  Hernia: A hernia is present  Comments: Several abdominal folds with erythema and satellitosis consistent with candidiasis/intertrigo  Genitourinary:     Comments: Normal external female genitalia  Normal Bartholin's and Highwood's glands  Normal urethral meatus and no evidence of urethral discharge or masses  Bladder without fullness mass or tenderness  Vagina is almost completely stenotic  Atrophic and  without lesion or discharge  No significant pelvic organ prolapse noted  Cervix and uterus are surgically absent  Bimanual exam demonstrates no evidence of pelvic masses  Rectal exam negative for pelvic masses or intraluminal lesions  Anus without fissure of lesion  Musculoskeletal:      Right lower leg: No edema  Left lower leg: No edema         Miguel Evans MD, [address] [place] [address]  3/29/2022  10:10 AM          Lab Results   Component Value Date     19 7 01/09/2019     Lab Results   Component Value Date     10/28/2015    K 4 3 05/14/2021     05/14/2021    CO2 28 05/14/2021    ANIONGAP 9 10/28/2015    BUN 16 05/14/2021    CREATININE 0 71 05/14/2021    GLUCOSE 226 (H) 10/28/2015    GLUF 239 (H) 01/09/2019    CALCIUM 8 9 05/14/2021    CORRECTEDCA 9 4 05/14/2021    AST 45 05/14/2021    ALT 46 05/14/2021    ALKPHOS 138 (H) 05/14/2021    PROT 6 3 (L) 10/28/2015    BILITOT 0 5 10/28/2015    EGFR 81 05/14/2021     Lab Results   Component Value Date    WBC 6 14 11/13/2019    HGB 7 6 (L) 11/13/2019    HCT 24 7 (L) 11/13/2019     (H) 11/13/2019     11/13/2019     Lab Results   Component Value Date    NEUTROABS 4 21 11/13/2019        Trend:  Lab Results   Component Value Date     19 7 01/09/2019     14 9 07/10/2018     10 6 01/02/2018     14 7 06/28/2017     13 7 02/07/2017     14 6 07/27/2016     14 5 05/11/2016     14 0 02/02/2016     14 1 11/18/2015     11 7 07/29/2015     12 4 05/06/2015     11 4 01/14/2015     12 4 10/28/2014

## 2022-03-29 NOTE — ASSESSMENT & PLAN NOTE
Diagnosed by DEXA scan I ordered in the spring of 2021  In May of 2021 she received 1st dose of zoledronic acid  She is scheduled for repeat yearly does later this spring  I emphasized importance of calcium and vitamin-D supplementation  Consider repeat DEXA scan in 2023

## 2022-03-29 NOTE — PATIENT INSTRUCTIONS
Keep appointment for mammogram   Continue calcium and vitamin-D   Keep appointment for zoledronic acid infusion  Use ketoconazole as instructed  Call me if you have any questions or new symptoms

## 2022-03-29 NOTE — ASSESSMENT & PLAN NOTE
Patient with evidence of intertrigo in abdominal fold and below breasts  I will prescribe ketoconazole and instructed her to use it twice a day for 14 days  Patient started to keep area clean and dry

## 2022-05-18 DIAGNOSIS — M81.0 OSTEOPOROSIS WITHOUT CURRENT PATHOLOGICAL FRACTURE, UNSPECIFIED OSTEOPOROSIS TYPE: Primary | ICD-10-CM

## 2022-05-18 RX ORDER — SODIUM CHLORIDE 9 MG/ML
20 INJECTION, SOLUTION INTRAVENOUS ONCE
Status: CANCELLED | OUTPATIENT
Start: 2022-05-20

## 2022-05-18 RX ORDER — ZOLEDRONIC ACID 5 MG/100ML
5 INJECTION, SOLUTION INTRAVENOUS ONCE
Status: CANCELLED | OUTPATIENT
Start: 2022-05-20

## 2022-05-20 ENCOUNTER — HOSPITAL ENCOUNTER (OUTPATIENT)
Dept: INFUSION CENTER | Facility: CLINIC | Age: 82
Discharge: HOME | End: 2022-05-20

## 2022-05-24 ENCOUNTER — HOSPITAL ENCOUNTER (OUTPATIENT)
Dept: INFUSION CENTER | Facility: CLINIC | Age: 82
Discharge: HOME | End: 2022-05-24
Payer: MEDICARE

## 2022-05-24 VITALS
DIASTOLIC BLOOD PRESSURE: 78 MMHG | BODY MASS INDEX: 30.24 KG/M2 | RESPIRATION RATE: 18 BRPM | WEIGHT: 165.34 LBS | SYSTOLIC BLOOD PRESSURE: 162 MMHG | TEMPERATURE: 98 F | HEART RATE: 69 BPM

## 2022-05-24 DIAGNOSIS — M81.0 OSTEOPOROSIS WITHOUT CURRENT PATHOLOGICAL FRACTURE, UNSPECIFIED OSTEOPOROSIS TYPE: Primary | ICD-10-CM

## 2022-05-24 PROCEDURE — 96374 THER/PROPH/DIAG INJ IV PUSH: CPT

## 2022-05-24 RX ORDER — ZOLEDRONIC ACID 5 MG/100ML
5 INJECTION, SOLUTION INTRAVENOUS ONCE
Status: COMPLETED | OUTPATIENT
Start: 2022-05-24 | End: 2022-05-24

## 2022-05-24 RX ORDER — ZOLEDRONIC ACID 5 MG/100ML
5 INJECTION, SOLUTION INTRAVENOUS ONCE
OUTPATIENT
Start: 2023-05-24

## 2022-05-24 RX ORDER — SODIUM CHLORIDE 9 MG/ML
20 INJECTION, SOLUTION INTRAVENOUS ONCE
Status: COMPLETED | OUTPATIENT
Start: 2022-05-24 | End: 2022-05-24

## 2022-05-24 RX ORDER — SODIUM CHLORIDE 9 MG/ML
20 INJECTION, SOLUTION INTRAVENOUS ONCE
OUTPATIENT
Start: 2023-05-24

## 2022-05-24 RX ADMIN — SODIUM CHLORIDE 20 ML/HR: 0.9 INJECTION, SOLUTION INTRAVENOUS at 10:04

## 2022-05-24 RX ADMIN — ZOLEDRONIC ACID 5 MG: 5 INJECTION, SOLUTION INTRAVENOUS at 10:06

## 2022-05-24 NOTE — PROGRESS NOTES
Pt arrived to unit without complaint  CrCl=54  Pt tolerated Reclast infusion without incident  AVS provided  Pt left unit in stable condition

## 2023-04-14 PROBLEM — Z08 ENCOUNTER FOR FOLLOW-UP EXAMINATION AFTER COMPLETED TREATMENT FOR MALIGNANT NEOPLASM: Status: ACTIVE | Noted: 2023-04-14

## 2023-05-24 ENCOUNTER — HOSPITAL ENCOUNTER (OUTPATIENT)
Dept: INFUSION CENTER | Facility: CLINIC | Age: 83
End: 2023-05-24

## 2023-06-28 ENCOUNTER — HOSPITAL ENCOUNTER (OUTPATIENT)
Dept: INFUSION CENTER | Facility: CLINIC | Age: 83
Discharge: HOME | End: 2023-06-28
Payer: MEDICARE

## 2023-06-28 VITALS — HEIGHT: 62 IN | BODY MASS INDEX: 28.25 KG/M2 | WEIGHT: 153.5 LBS

## 2023-06-28 DIAGNOSIS — M81.0 OSTEOPOROSIS WITHOUT CURRENT PATHOLOGICAL FRACTURE, UNSPECIFIED OSTEOPOROSIS TYPE: Primary | ICD-10-CM

## 2023-06-28 RX ORDER — SODIUM CHLORIDE 9 MG/ML
20 INJECTION, SOLUTION INTRAVENOUS ONCE
Status: CANCELLED | OUTPATIENT
Start: 2023-06-28

## 2023-06-28 RX ORDER — ZOLEDRONIC ACID 5 MG/100ML
5 INJECTION, SOLUTION INTRAVENOUS ONCE
OUTPATIENT
Start: 2024-06-27

## 2023-06-28 RX ORDER — SODIUM CHLORIDE 9 MG/ML
20 INJECTION, SOLUTION INTRAVENOUS ONCE
Status: COMPLETED | OUTPATIENT
Start: 2023-06-28 | End: 2023-06-28

## 2023-06-28 RX ORDER — SODIUM CHLORIDE 9 MG/ML
20 INJECTION, SOLUTION INTRAVENOUS ONCE
OUTPATIENT
Start: 2024-06-27

## 2023-06-28 RX ORDER — ZOLEDRONIC ACID 5 MG/100ML
5 INJECTION, SOLUTION INTRAVENOUS ONCE
Status: CANCELLED | OUTPATIENT
Start: 2023-06-28

## 2023-06-28 RX ORDER — ZOLEDRONIC ACID 5 MG/100ML
5 INJECTION, SOLUTION INTRAVENOUS ONCE
Status: COMPLETED | OUTPATIENT
Start: 2023-06-28 | End: 2023-06-28

## 2023-06-28 RX ADMIN — ZOLEDRONIC ACID 5 MG: 0.05 INJECTION, SOLUTION INTRAVENOUS at 11:21

## 2023-06-28 RX ADMIN — SODIUM CHLORIDE 20 ML/HR: 0.9 INJECTION, SOLUTION INTRAVENOUS at 10:54

## 2023-06-28 NOTE — PROGRESS NOTES
Patient arrived to unit without complaint  Patient denied any current jaw pain or recent or planned invasive dental procedures  Parameters reviewed  Patient tolerated Reclast infusion without incident  AVS declined and patient will call back after next appointment with Lady Mcgraw  Patient left in stable condition

## 2023-07-18 ENCOUNTER — TELEPHONE (OUTPATIENT)
Dept: HEMATOLOGY ONCOLOGY | Facility: CLINIC | Age: 83
End: 2023-07-18

## 2023-07-18 ENCOUNTER — TELEPHONE (OUTPATIENT)
Dept: GYNECOLOGIC ONCOLOGY | Facility: CLINIC | Age: 83
End: 2023-07-18

## 2023-07-18 NOTE — TELEPHONE ENCOUNTER
Appointment Change  Cancel, Reschedule, Change to Virtual      Who are you speaking with? Child   If it is not the patient, are they listed on an active communication consent form? Yes   Which provider is the appointment scheduled with? CECILIA De Los Santos   When is the appointment scheduled? Please list date and time 7/18/23   At which location is the appointment scheduled to take place? [place]   Was the appointment rescheduled or changed from an in person visit to a virtual visit? If so, please list the details of the change. 8/11/23   What is the reason for the appointment change?  appt conflict

## 2023-07-25 ENCOUNTER — TELEPHONE (OUTPATIENT)
Dept: OTHER | Facility: OTHER | Age: 83
End: 2023-07-25

## 2023-07-25 NOTE — TELEPHONE ENCOUNTER
The patient daughter call to report that she don't her mother to been see in [place] office only at [facility]

## 2023-07-31 ENCOUNTER — TELEPHONE (OUTPATIENT)
Dept: GYNECOLOGIC ONCOLOGY | Facility: CLINIC | Age: 83
End: 2023-07-31

## 2023-07-31 NOTE — TELEPHONE ENCOUNTER
Called to inform patient that we needed to change her appointment from 8/11 to 8/15 with provider. I instructed patient to call the office back if the appointment on the 15th @ 2pm does not work for her. I left the office number on the voicemail.

## 2023-08-14 ENCOUNTER — TELEPHONE (OUTPATIENT)
Dept: GYNECOLOGIC ONCOLOGY | Facility: CLINIC | Age: 83
End: 2023-08-14

## 2023-08-14 ENCOUNTER — TELEPHONE (OUTPATIENT)
Dept: HEMATOLOGY ONCOLOGY | Facility: CLINIC | Age: 83
End: 2023-08-14

## 2023-08-14 NOTE — TELEPHONE ENCOUNTER
Appointment Change  Cancel, Reschedule, Change to Virtual      Who are you speaking with? Child   If it is not the patient, are they listed on an active communication consent form? Yes   Which provider is the appointment scheduled with? CECILIA Angel   When is the appointment scheduled? Please list date and time 8/15/23 2:00PM   At which location is the appointment scheduled to take place? [place] [facility]   Was the appointment rescheduled or changed from an in person visit to a virtual visit? If so, please list the details of the change. 8/15/23 10:30AM   What is the reason for the appointment change? Provider asked if patient can come in earlier in the day   Was [facility] transport scheduled for this visit? No   Does [facility] transport need to be scheduled for the new visit (if applicable) No   Does the patient need an infusion appointment rescheduled? No   Does the patient have an infusion appointment scheduled? If so, when? No   Is the patient undergoing chemotherapy? No   Was the no-show policy reviewed for appointments being changed with less then 24 hours of notice?  No

## 2023-08-15 ENCOUNTER — OFFICE VISIT (OUTPATIENT)
Dept: GYNECOLOGIC ONCOLOGY | Facility: CLINIC | Age: 83
End: 2023-08-15

## 2023-08-15 VITALS
HEART RATE: 68 BPM | RESPIRATION RATE: 18 BRPM | HEIGHT: 62 IN | TEMPERATURE: 97.4 F | BODY MASS INDEX: 30.33 KG/M2 | SYSTOLIC BLOOD PRESSURE: 146 MMHG | WEIGHT: 164.8 LBS | DIASTOLIC BLOOD PRESSURE: 60 MMHG | OXYGEN SATURATION: 96 %

## 2023-08-15 DIAGNOSIS — Z85.42 H/O CANCER OF UTERUS: ICD-10-CM

## 2023-08-15 DIAGNOSIS — Z79.4 CONTROLLED TYPE 2 DIABETES MELLITUS WITH DIABETIC POLYNEUROPATHY, WITH LONG-TERM CURRENT USE OF INSULIN (HCC): ICD-10-CM

## 2023-08-15 DIAGNOSIS — E11.9 TYPE 2 DIABETES MELLITUS WITHOUT COMPLICATION, WITHOUT LONG-TERM CURRENT USE OF INSULIN (HCC): ICD-10-CM

## 2023-08-15 DIAGNOSIS — Z12.31 ENCOUNTER FOR SCREENING MAMMOGRAM FOR MALIGNANT NEOPLASM OF BREAST: ICD-10-CM

## 2023-08-15 DIAGNOSIS — Z85.42 ENCOUNTER FOR FOLLOW-UP SURVEILLANCE OF ENDOMETRIAL CANCER: Primary | ICD-10-CM

## 2023-08-15 DIAGNOSIS — Z08 ENCOUNTER FOR FOLLOW-UP SURVEILLANCE OF ENDOMETRIAL CANCER: Primary | ICD-10-CM

## 2023-08-15 DIAGNOSIS — M81.0 OSTEOPOROSIS WITHOUT CURRENT PATHOLOGICAL FRACTURE, UNSPECIFIED OSTEOPOROSIS TYPE: ICD-10-CM

## 2023-08-15 DIAGNOSIS — E11.42 CONTROLLED TYPE 2 DIABETES MELLITUS WITH DIABETIC POLYNEUROPATHY, WITH LONG-TERM CURRENT USE OF INSULIN (HCC): ICD-10-CM

## 2023-08-15 DIAGNOSIS — K43.9 VENTRAL HERNIA WITHOUT OBSTRUCTION OR GANGRENE: ICD-10-CM

## 2023-08-15 RX ORDER — DICYCLOMINE HYDROCHLORIDE 10 MG/1
CAPSULE ORAL
COMMUNITY

## 2023-08-15 RX ORDER — HYDROXYZINE HYDROCHLORIDE 10 MG/1
TABLET, FILM COATED ORAL
COMMUNITY
Start: 2023-06-09

## 2023-08-15 RX ORDER — TRAMADOL HYDROCHLORIDE 50 MG/1
TABLET ORAL
COMMUNITY
Start: 2023-06-16

## 2023-08-15 RX ORDER — PANTOPRAZOLE SODIUM 40 MG/1
TABLET, DELAYED RELEASE ORAL
COMMUNITY
Start: 2023-08-13

## 2023-08-15 RX ORDER — TRAZODONE HYDROCHLORIDE 50 MG/1
TABLET ORAL
COMMUNITY
Start: 2023-07-31

## 2023-08-15 RX ORDER — LOPERAMIDE HYDROCHLORIDE 2 MG/1
CAPSULE ORAL
COMMUNITY

## 2023-08-15 RX ORDER — ATORVASTATIN CALCIUM 40 MG/1
40 TABLET, FILM COATED ORAL EVERY EVENING
COMMUNITY
Start: 2023-05-16

## 2023-08-15 RX ORDER — IPRATROPIUM BROMIDE 21 UG/1
1 SPRAY, METERED NASAL 3 TIMES DAILY
COMMUNITY
Start: 2023-07-18

## 2023-08-15 RX ORDER — METOPROLOL SUCCINATE 50 MG/1
TABLET, EXTENDED RELEASE ORAL
COMMUNITY

## 2023-08-15 RX ORDER — LANOLIN ALCOHOL/MO/W.PET/CERES
1000 CREAM (GRAM) TOPICAL DAILY
COMMUNITY
Start: 2023-07-17

## 2023-08-15 RX ORDER — HYDROCHLOROTHIAZIDE 25 MG/1
25 TABLET ORAL DAILY
COMMUNITY
Start: 2023-04-07

## 2023-08-15 RX ORDER — PANCRELIPASE 36000; 180000; 114000 [USP'U]/1; [USP'U]/1; [USP'U]/1
CAPSULE, DELAYED RELEASE PELLETS ORAL
COMMUNITY
Start: 2023-02-23

## 2023-08-15 RX ORDER — FLUTICASONE FUROATE, UMECLIDINIUM BROMIDE AND VILANTEROL TRIFENATATE 100; 62.5; 25 UG/1; UG/1; UG/1
1 POWDER RESPIRATORY (INHALATION) DAILY
COMMUNITY
Start: 2023-04-15

## 2023-08-15 RX ORDER — MONTELUKAST SODIUM 10 MG/1
TABLET ORAL
COMMUNITY

## 2023-08-15 RX ORDER — FLUTICASONE FUROATE, UMECLIDINIUM BROMIDE AND VILANTEROL TRIFENATATE 100; 62.5; 25 UG/1; UG/1; UG/1
POWDER RESPIRATORY (INHALATION)
COMMUNITY
Start: 2023-08-13

## 2023-08-15 RX ORDER — CHOLESTYRAMINE LIGHT 4 G/5.7G
4 POWDER, FOR SUSPENSION ORAL DAILY
COMMUNITY

## 2023-08-15 RX ORDER — VIT C/VIT E/LUTEIN/MIN/OMEGA-3 100-15-2
CAPSULE ORAL
COMMUNITY

## 2023-08-15 RX ORDER — LOSARTAN POTASSIUM 100 MG/1
100 TABLET ORAL EVERY EVENING
COMMUNITY
Start: 2023-06-06

## 2023-08-15 NOTE — PROGRESS NOTES
Assessment/Plan:    Problem List Items Addressed This Visit        Endocrine    Type 2 diabetes mellitus without complication, without long-term current use of insulin ([address])       Lab Results   Component Value Date    HGBA1C 6.8 (H) 11/08/2022     Due for repeat HbA1c. Order placed. PCP and endocrinology f/u scheduled for October. Relevant Orders    HEMOGLOBIN A1C W/ EAG ESTIMATION       Musculoskeletal and Integument    Osteoporosis     Reclast given in June 2023. Due June 2024. Patient is due for repeat DXA. Order placed. Encouraged her to continue to take at least 1200 mg of calcium and 1000 IU vitamin D daily. Relevant Orders    DXA bone density spine hip and pelvis       Other    H/O cancer of uterus    Encounter for screening mammogram for malignant neoplasm of breast    Relevant Orders    Mammo screening left w 3d & cad    Ventral hernia without obstruction or gangrene     Stable. Given age and co-morbidities, patient is not interested in surgical management. Encounter for follow-up surveillance of endometrial cancer - Primary     46-year-old with a history of stage IIIC2 uterine carcinosarcoma. She had surgery in January 2014, and completed chemotherapy in July 2014. She has been without evidence of disease for over 9 years. She is feeling well and has no gyn concerns. Her pelvic exam reveals no gross disease. Her PS is 2. Patient will continue yearly follow up with PCP. She and her daughter understand that we remain available and are aware to call the office with any new pain, bleeding, or discharge. CHIEF COMPLAINT: Uterine cancer surveillance      Subjective:     Problem:  Cancer Staging   No matching staging information was found for the patient.     Previous therapy:  Oncology History   H/O cancer of uterus   1/13/2014 Surgery    Robotic-assisted extensive lysis of adhesions, total hysterectomy, bilateral salpingo-oophorectomy, omentectomy, pelvic and periaortic lymphadenectomy. 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 Positive washings. - 7/11/2014 Chemotherapy    Adjuvant chemotherapy + RT: s/p RT after 3 cycles of carbo/taxol (sandwiched plan) plus x3 cycles of post-RT chemo (sandwiched). Completed July 11th 2014. Flexible sigmoidoscopy November 2017 (Dr. Giovanna Catalan): no evidence of polyps or malignancy. Radiation changes noted. Patient ID: Jalen Alvarado is a 81 y.o. female     Chan Campbell has been well in the interim and is without acute complaints. She is fatigued at times. She denies nausea or vomiting. Her appetite is appropriate. She is voiding and moving her bowels without difficulty. There is occasional stool incontinence. She denies abdominal or pelvic pain. The patient is without vaginal bleeding or discharge. She is ambulatory. Review of Systems   Constitutional: Positive for fatigue. Negative for chills, fever and unexpected weight change. HENT: Negative for nosebleeds. Eyes: Negative. Respiratory: Negative for cough, chest tightness, shortness of breath and wheezing. Cardiovascular: Negative for chest pain, palpitations and leg swelling. Gastrointestinal: Negative for abdominal distention, abdominal pain, anal bleeding, blood in stool, constipation, diarrhea, nausea, rectal pain and vomiting. Endocrine: Negative. Genitourinary: Negative for difficulty urinating, dysuria, frequency, hematuria, pelvic pain, urgency, vaginal bleeding, vaginal discharge and vaginal pain. Musculoskeletal: Positive for arthralgias. Negative for joint swelling. Skin: Negative for color change, pallor and rash. Neurological: Negative for dizziness, weakness, light-headedness, numbness and headaches. Hematological: Negative. Psychiatric/Behavioral: Negative.         Current Outpatient Medications   Medication Sig Dispense Refill   • albuterol (PROVENTIL HFA,VENTOLIN HFA) 90 mcg/act inhaler Inhale 2 puffs every 6 (six) hours as needed for wheezing. • apixaban (ELIQUIS) 5 mg Take 5 mg by mouth 2 (two) times a day     • atorvastatin (LIPITOR) 40 mg tablet Take 40 mg by mouth every evening     • cholecalciferol (VITAMIN D3) 1,000 units tablet Take 1 tablet (1,000 Units total) by mouth daily 30 tablet 5   • cholestyramine light (PREVALITE) 4 GM/DOSE powder Take 4 g by mouth daily     • Continuous Blood Gluc  (FreeStyle Min[place]a 2 [place]) ANAHI      • dicyclomine (BENTYL) 10 mg capsule      • ferrous sulfate 325 (65 Fe) mg tablet Take 325 mg by mouth daily with breakfast     • fluticasone-umeclidinium-vilanterol (Trelegy Ellipta) 100-62.5-25 mcg/actuation inhaler Inhale 1 puff daily     • folic acid (FOLVITE) 1 mg tablet Take 1 mg by mouth daily.      • gabapentin (NEURONTIN) 100 mg capsule Take 100 mg by mouth daily at bedtime     • glucose blood test strip      • hydrochlorothiazide (HYDRODIURIL) 25 mg tablet Take 25 mg by mouth daily     • hydrOXYzine HCL (ATARAX) 10 mg tablet TAKE 1 TABLET BY MOUTH DAILY AS NEEDED FOR ITCHING OR ANXIETY     • insulin aspart (NovoLOG) 100 units/mL injection Inject 4-6 Units under the skin 3 (three) times a day before meals      • insulin glargine (LANTUS) 100 units/mL subcutaneous injection Inject 36 Units under the skin daily      • ipratropium (ATROVENT) 0.03 % nasal spray 1 spray 3 (three) times a day     • loperamide (IMODIUM) 2 mg capsule      • losartan (COZAAR) 100 MG tablet Take 100 mg by mouth every evening     • methotrexate 2.5 mg tablet Take 20 mg by mouth once a week Pt takes 6 tabs on Wednesdays     • metoprolol succinate (TOPROL-XL) 50 mg 24 hr tablet      • montelukast (SINGULAIR) 10 mg tablet      • Multiple Vitamins-Minerals (Ocuvite Adult Formula) CAPS      • Pancrelipase, Lip-Prot-Amyl, (Creon) 19151-423092 units CPEP TAKE 2 CAPSULES BY MOUTH WITH FIRST BITE OF EVERY MEAL AND 1 CAPS. ..  (REFER TO PRESCRIPTION NOTES). • pantoprazole (PROTONIX) 40 mg tablet      • traMADol (ULTRAM) 50 mg tablet TAKE 1/2 TABLET BY MOUTH EVERY 6 HOURS AS NEEDED FOR MODERATE ADRIAN. ..  (REFER TO PRESCRIPTION NOTES). • traZODone (DESYREL) 50 mg tablet take 1/2 to 1 tablet by mouth daily at bedtime if needed for DIFFICULTY SLEEPING     • Trelegy Ellipta 100-62.5-25 MCG/ACT inhaler      • vitamin B-12 (VITAMIN B-12) 1,000 mcg tablet Take 1,000 mcg by mouth daily     • acetaminophen (TYLENOL) 500 mg tablet Take 1,000 mg by mouth every 8 (eight) hours as needed     • aspirin 81 MG tablet Take 81 mg by mouth daily      • atorvastatin (LIPITOR) 10 mg tablet Take 40 mg by mouth daily  (Patient not taking: Reported on 8/15/2023)     • clobetasol (TEMOVATE) 0.05 % ointment Apply topically 2 (two) times a day For 1 week, then 3 days a week for 1 week, then once a week as needed (Patient not taking: Reported on 4/2/2019) 30 g 1   • FLOVENT DISKUS 100 MCG/BLIST AEPB INHALE 1 PUFF 2 TIMES A DAY UNTIL COUGH RESOLVED  0   • ketoconazole (NIZORAL) 2 % cream Apply to affected areas twice a day for 14 days. Keep areas clean and dry. 15 g 1   • lidocaine-prilocaine (EMLA) cream Apply topically as needed for mild pain (Patient not taking: Reported on 3/17/2020) 30 g 1   • LORazepam (ATIVAN) 1 mg tablet Take 0.5 mg by mouth 2 (two) times a day as needed for anxiety      • LOSARTAN POTASSIUM PO Take 50 mg by mouth daily. (Patient not taking: Reported on 8/15/2023)     • metFORMIN (GLUCOPHAGE-XR) 500 mg 24 hr tablet Take 500 mg by mouth daily with breakfast     • pantoprazole (PROTONIX) 20 mg tablet Take 20 mg by mouth daily (Patient not taking: Reported on 8/15/2023)       No current facility-administered medications for this visit.        Allergies   Allergen Reactions   • Lisinopril Cough     Other reaction(s): LISINOPRIL (Cough)   • Other Rash     Has contact reaction to bandaids   • Azithromycin Rash     Other reaction(s): AZITHROMYCIN (ZITHROMAX) (Rash)       Past Medical History:   Diagnosis Date   • Anemia    • Arthritis, rheumatoid (HCC)    • Cancer ([address])    • Diabetes mellitus ([address])    • Hyperlipidemia    • Hypertension    • Radiation proctitis    • Rheumatoid arthritis ([address])    • Uterine cancer [facility])        Past Surgical History:   Procedure Laterality Date   • HYSTERECTOMY     • IR PORT REMOVAL  2020       OB History        1    Para   1    Term   1            AB        Living           SAB        IAB        Ectopic        Multiple        Live Births                     No family history on file. The following portions of the patient's history were reviewed and updated as appropriate: allergies, current medications, past family history, past medical history, past social history, past surgical history and problem list.      Objective:    Blood pressure 146/60, pulse 68, temperature (!) 97.4 °F (36.3 °C), temperature source Temporal, resp. rate 18, height 5' 2.01" (1.575 m), weight 74.8 kg (164 lb 12.8 oz), SpO2 96 %. Body mass index is 30.13 kg/m². Physical Exam  Vitals reviewed. Exam conducted with a chaperone present. Constitutional:       General: She is not in acute distress. Appearance: Normal appearance. She is not ill-appearing. HENT:      Head: Normocephalic and atraumatic. Mouth/Throat:      Mouth: Mucous membranes are moist.   Eyes:      General:         Right eye: No discharge. Left eye: No discharge. Conjunctiva/sclera: Conjunctivae normal.   Pulmonary:      Effort: Pulmonary effort is normal.   Abdominal:      Palpations: Abdomen is soft. There is no mass. Tenderness: There is no abdominal tenderness. Hernia: No hernia is present. Genitourinary:     Comments: The external female genitalia is normal. The bartholin's, uretheral and skenes glands are normal. The urethral meatus is normal (midline with no lesions). Anus without fissure or lesion. Speculum exam reveals a grossly normal vagina that is atrophic and foreshortened. No masses, lesions,discharge or bleeding. No significant cystocele or rectocele noted. Bimanual exam notes a surgical absent cervix, uterus and adnexal structures. No masses or fullness. Bladder is without fullness, mass or tenderness. Musculoskeletal:      Right lower leg: No edema. Left lower leg: No edema. Skin:     General: Skin is warm and dry. Coloration: Skin is not jaundiced. Findings: No rash. Neurological:      General: No focal deficit present. Mental Status: She is alert and oriented to person, place, and time. Cranial Nerves: No cranial nerve deficit. Sensory: No sensory deficit. Motor: No weakness. Gait: Gait normal.   Psychiatric:         Mood and Affect: Mood normal.         Behavior: Behavior normal.         Thought Content:  Thought content normal.         Judgment: Judgment normal.           Lab Results   Component Value Date     19.7 01/09/2019     Lab Results   Component Value Date    WBC 6.14 11/13/2019    HGB 7.6 (L) 11/13/2019    HCT 24.7 (L) 11/13/2019     (H) 11/13/2019     11/13/2019     Lab Results   Component Value Date     10/28/2015    K 4.3 05/14/2021     05/14/2021    CO2 28 05/14/2021    ANIONGAP 9 10/28/2015    BUN 16 05/14/2021    CREATININE 0.71 05/14/2021    GLUCOSE 226 (H) 10/28/2015    GLUF 239 (H) 01/09/2019    CALCIUM 8.9 05/14/2021    CORRECTEDCA 9.4 05/14/2021    AST 45 05/14/2021    ALT 46 05/14/2021    ALKPHOS 138 (H) 05/14/2021    PROT 6.3 (L) 10/28/2015    BILITOT 0.5 10/28/2015    EGFR 81 05/14/2021        Trend:  Lab Results   Component Value Date     19.7 01/09/2019     14.9 07/10/2018     10.6 01/02/2018     14.7 06/28/2017     13.7 02/07/2017     14.6 07/27/2016     14.5 05/11/2016     14.0 02/02/2016     14.1 11/18/2015     11.7 07/29/2015     12.4 05/06/2015     11.4 01/14/2015     12.4 10/28/2014

## 2023-08-15 NOTE — ASSESSMENT & PLAN NOTE
Lab Results   Component Value Date    HGBA1C 6.8 (H) 11/08/2022     Due for repeat HbA1c. Order placed. PCP and endocrinology f/u scheduled for October.

## 2023-08-15 NOTE — ASSESSMENT & PLAN NOTE
Reclast given in June 2023. Due June 2024. Patient is due for repeat DXA. Order placed. Encouraged her to continue to take at least 1200 mg of calcium and 1000 IU vitamin D daily.

## 2023-08-15 NOTE — ASSESSMENT & PLAN NOTE
20-year-old with a history of stage IIIC2 uterine carcinosarcoma. She had surgery in January 2014, and completed chemotherapy in July 2014. She has been without evidence of disease for over 9 years. She is feeling well and has no gyn concerns. Her pelvic exam reveals no gross disease. Her PS is 2. Patient will continue yearly follow up with PCP. She and her daughter understand that we remain available and are aware to call the office with any new pain, bleeding, or discharge.

## 2023-08-15 NOTE — PATIENT INSTRUCTIONS
1. Return to primary gynecologist/PCP for routine care. 2. Call the office if you develop any new or concerning symptoms.

## 2023-10-09 LAB — HBA1C MFR BLD HPLC: 7.4 %

## 2023-10-17 ENCOUNTER — HOSPITAL ENCOUNTER (OUTPATIENT)
Dept: BONE DENSITY | Facility: MEDICAL CENTER | Age: 83
Discharge: HOME | End: 2023-10-17
Payer: MEDICARE

## 2023-10-17 ENCOUNTER — HOSPITAL ENCOUNTER (OUTPATIENT)
Dept: MAMMOGRAPHY | Facility: MEDICAL CENTER | Age: 83
Discharge: HOME | End: 2023-10-17
Payer: MEDICARE

## 2023-10-17 VITALS — HEIGHT: 62 IN | BODY MASS INDEX: 30.35 KG/M2 | WEIGHT: 164.9 LBS

## 2023-10-17 DIAGNOSIS — Z12.31 ENCOUNTER FOR SCREENING MAMMOGRAM FOR MALIGNANT NEOPLASM OF BREAST: ICD-10-CM

## 2023-10-17 DIAGNOSIS — M81.0 OSTEOPOROSIS WITHOUT CURRENT PATHOLOGICAL FRACTURE, UNSPECIFIED OSTEOPOROSIS TYPE: ICD-10-CM

## 2023-10-17 PROCEDURE — 77063 BREAST TOMOSYNTHESIS BI: CPT

## 2023-10-17 PROCEDURE — 77067 SCR MAMMO BI INCL CAD: CPT

## 2023-10-17 PROCEDURE — 77080 DXA BONE DENSITY AXIAL: CPT

## 2023-11-17 ENCOUNTER — DOCUMENTATION (OUTPATIENT)
Dept: HEMATOLOGY ONCOLOGY | Facility: CLINIC | Age: 83
End: 2023-11-17